# Patient Record
Sex: FEMALE | Race: WHITE | NOT HISPANIC OR LATINO | Employment: FULL TIME | URBAN - METROPOLITAN AREA
[De-identification: names, ages, dates, MRNs, and addresses within clinical notes are randomized per-mention and may not be internally consistent; named-entity substitution may affect disease eponyms.]

---

## 2017-01-09 ENCOUNTER — OFFICE VISIT (OUTPATIENT)
Dept: RHEUMATOLOGY | Facility: CLINIC | Age: 42
End: 2017-01-09
Payer: COMMERCIAL

## 2017-01-09 VITALS
WEIGHT: 174.31 LBS | HEIGHT: 66 IN | DIASTOLIC BLOOD PRESSURE: 93 MMHG | SYSTOLIC BLOOD PRESSURE: 145 MMHG | BODY MASS INDEX: 28.02 KG/M2 | HEART RATE: 84 BPM

## 2017-01-09 DIAGNOSIS — M32.12 SYSTEMIC LUPUS ERYTHEMATOSUS (SLE) WITH PERICARDITIS, UNSPECIFIED SLE TYPE: Primary | ICD-10-CM

## 2017-01-09 PROCEDURE — 1159F MED LIST DOCD IN RCRD: CPT | Mod: S$GLB,,, | Performed by: INTERNAL MEDICINE

## 2017-01-09 PROCEDURE — 99999 PR PBB SHADOW E&M-EST. PATIENT-LVL III: CPT | Mod: PBBFAC,,, | Performed by: INTERNAL MEDICINE

## 2017-01-09 PROCEDURE — 99214 OFFICE O/P EST MOD 30 MIN: CPT | Mod: S$GLB,,, | Performed by: INTERNAL MEDICINE

## 2017-01-09 ASSESSMENT — ROUTINE ASSESSMENT OF PATIENT INDEX DATA (RAPID3)
FATIGUE SCORE: 3
PATIENT GLOBAL ASSESSMENT SCORE: 3
PSYCHOLOGICAL DISTRESS SCORE: 2.2
MDHAQ FUNCTION SCORE: 0
TOTAL RAPID3 SCORE: 2
AM STIFFNESS SCORE: 0, NO
PAIN SCORE: 3

## 2017-01-11 NOTE — PROGRESS NOTES
History of present illness: 41-year-old female I saw one month ago.  She presented at that time with pleuropericarditis and arthritis.  She had a positive KRUNAL and anticardiolipin antibody.  I diagnosed her as having lupus.  She was on prednisone 20 mg daily.  I told her to decrease the prednisone to 10 mg and remain on that dosage until her next visit.  She ran out of the prednisone and did not get it refilled.  I also placed her on Plaquenil.  She has done fairly well since her last visit.  She has had some pain in the left scapula.  It is worse when she lays on her side or with walking.  She continues to have occasional pleuritic pain.  She has had no other areas of joint pain or swelling.  She has had no shortness of breath.  She has had no fevers.  She has developed some papules on her face.  She complains of dry eye and mouth.  She has had no abdominal pain.  She had a tubal ligation since her last visit.    Physical examination:  Skin: No rashes  ENT: Adequate tears and saliva  Chest: Clear to auscultation and percussion  Cardiac: No murmurs, gallops, rubs  Abdomen: No organomegaly or masses.  No tenderness to palpation  Musculoskeletal: Full range of motion of all joints.  No synovitis    Assessment: Active SLE    Plans: Continue Plaquenil as before.  Further laboratory studies are obtained.  She is to return to see me in 3 months.

## 2017-01-26 ENCOUNTER — TELEPHONE (OUTPATIENT)
Dept: RHEUMATOLOGY | Facility: CLINIC | Age: 42
End: 2017-01-26

## 2017-02-07 ENCOUNTER — PATIENT MESSAGE (OUTPATIENT)
Dept: RHEUMATOLOGY | Facility: CLINIC | Age: 42
End: 2017-02-07

## 2017-02-09 RX ORDER — PREDNISONE 5 MG/1
10 TABLET ORAL DAILY
Qty: 60 TABLET | Refills: 2 | Status: SHIPPED | OUTPATIENT
Start: 2017-02-09 | End: 2017-02-13 | Stop reason: SDUPTHER

## 2017-02-09 RX ORDER — INDOMETHACIN 25 MG/1
25 CAPSULE ORAL 3 TIMES DAILY
Qty: 90 CAPSULE | Refills: 3 | Status: SHIPPED | OUTPATIENT
Start: 2017-02-09 | End: 2017-02-13 | Stop reason: ALTCHOICE

## 2017-02-13 ENCOUNTER — OFFICE VISIT (OUTPATIENT)
Dept: RHEUMATOLOGY | Facility: CLINIC | Age: 42
End: 2017-02-13
Payer: COMMERCIAL

## 2017-02-13 DIAGNOSIS — M32.19 SYSTEMIC LUPUS ERYTHEMATOSUS WITH OTHER ORGAN INVOLVEMENT: ICD-10-CM

## 2017-02-13 DIAGNOSIS — M79.10 MYALGIA: Primary | ICD-10-CM

## 2017-02-13 PROBLEM — M32.9 SYSTEMIC LUPUS ERYTHEMATOSUS: Status: ACTIVE | Noted: 2017-02-13

## 2017-02-13 PROCEDURE — 99999 PR PBB SHADOW E&M-EST. PATIENT-LVL II: CPT | Mod: PBBFAC,,, | Performed by: INTERNAL MEDICINE

## 2017-02-13 PROCEDURE — 99214 OFFICE O/P EST MOD 30 MIN: CPT | Mod: S$GLB,,, | Performed by: INTERNAL MEDICINE

## 2017-02-13 RX ORDER — PREDNISONE 5 MG/1
10 TABLET ORAL DAILY
Qty: 60 TABLET | Refills: 2 | Status: SHIPPED | OUTPATIENT
Start: 2017-02-13 | End: 2017-03-15

## 2017-02-13 RX ORDER — IBUPROFEN 800 MG/1
800 TABLET ORAL 3 TIMES DAILY
Qty: 90 TABLET | Refills: 4 | Status: SHIPPED | OUTPATIENT
Start: 2017-02-13 | End: 2017-03-15

## 2017-02-13 NOTE — PROGRESS NOTES
History of present illness: 41-year-old female I saw initially in December.  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  She was not diagnosed as lupus at that time.  In November she had a flare of her arthritis and also developed pleuropericarditis, confirmed on CT scan.  She was placed on prednisone and indomethacin.  She had a history of photosensitive rash.  I diagnosed her as having SLE.  I placed her on a prednisone taper.  I also placed her on Plaquenil.  When she was seen in January she was doing quite well.  She had tapered herself off prednisone.  She had no sign of active disease.  I just continued her on Plaquenil.  Laboratory studies revealed increased inflammatory markers but I may no change in her treatment.    She called me 2 weeks later because of increased pain in the infrascapular region and some shortness of breath.  I placed her back on prednisone for 4 days.  This helped but her symptoms recurred on stopping the prednisone.  I placed her back on prednisone and she is now on 10 mg daily.  I also told her to resume indomethacin but her prescription had run out.  She is taking ibuprofen 800 mg twice daily with some relief.  She still has some pain in the left flank.  She has had some infrascapular pain.  She has had no fever, headache, rash.  The pain is occasionally pleuritic.  She has some shortness of breath with walking up stairs.  She has had no dysuria or hematuria.  She has had no abdominal pain.    Her pain is worse at the end of the day.  She denies any morning pain or morning stiffness.  She has had no muscle weakness.  The pain seems to start in her shoulder area and then radiate down her back.  She has had some pain in the neck.  She has had no problems in the arms or legs.    Physical examination:  Skin: She has malar erythema  Chest: Clear to auscultation and percussion  Cardiac: No murmurs, gallops, rubs  Abdomen: No organomegaly or masses.  No tenderness to  patient  Musculoskeletal: She has full range of motion of the cervical spine.  Shoulders have full range of motion without pain on range of motion.  She has no tender areas to palpation.  Elbows, wrists, small joints of the hand are unremarkable.  She has no tenderness along the thoracic or lumbar spine.  She does have pain in the left rib cage on lateral bending to the left.  She has no tender area to palpation.  She has no CVA tenderness.  Lumbar spine has no tenderness.  Hips, knees, ankles, small joints the feet are unremarkable.    Assessment: Clinically I cannot confirm a flare of her SLE.  I do not think she has had a recurrence of her pleurisy.  Her pain seems to be more muscular.  She did have the previous increase in inflammatory markers.    Plans:  1.  Laboratory studies are obtained  2.  Continue prednisone 10 mg daily for one more week, then decrease to 5 mg daily  3.  Continue ibuprofen up to 3 times daily  4.  Use heat to the area  5.  Continue Plaquenil as before  6.  Keep previous appointment

## 2017-04-07 RX ORDER — HYDROXYCHLOROQUINE SULFATE 200 MG/1
400 TABLET, FILM COATED ORAL DAILY
Qty: 60 TABLET | Refills: 4 | Status: SHIPPED | OUTPATIENT
Start: 2017-04-07 | End: 2017-05-07

## 2017-04-11 ENCOUNTER — OFFICE VISIT (OUTPATIENT)
Dept: RHEUMATOLOGY | Facility: CLINIC | Age: 42
End: 2017-04-11
Payer: COMMERCIAL

## 2017-04-11 VITALS
HEIGHT: 66 IN | HEART RATE: 80 BPM | SYSTOLIC BLOOD PRESSURE: 128 MMHG | DIASTOLIC BLOOD PRESSURE: 87 MMHG | WEIGHT: 171.31 LBS | BODY MASS INDEX: 27.53 KG/M2

## 2017-04-11 DIAGNOSIS — M32.19 SYSTEMIC LUPUS ERYTHEMATOSUS WITH OTHER ORGAN INVOLVEMENT: Primary | ICD-10-CM

## 2017-04-11 PROCEDURE — 1160F RVW MEDS BY RX/DR IN RCRD: CPT | Mod: S$GLB,,, | Performed by: INTERNAL MEDICINE

## 2017-04-11 PROCEDURE — 99999 PR PBB SHADOW E&M-EST. PATIENT-LVL III: CPT | Mod: PBBFAC,,, | Performed by: INTERNAL MEDICINE

## 2017-04-11 PROCEDURE — 99214 OFFICE O/P EST MOD 30 MIN: CPT | Mod: S$GLB,,, | Performed by: INTERNAL MEDICINE

## 2017-04-11 RX ORDER — IBUPROFEN 800 MG/1
800 TABLET ORAL 3 TIMES DAILY
Refills: 4 | COMMUNITY
Start: 2017-04-07 | End: 2017-10-16 | Stop reason: SDUPTHER

## 2017-04-11 RX ORDER — PREDNISONE 5 MG/1
TABLET ORAL
Refills: 2 | COMMUNITY
Start: 2017-04-07 | End: 2017-08-08 | Stop reason: SDUPTHER

## 2017-04-11 NOTE — PROGRESS NOTES
History of present illness: 41-year-old female I saw initially in December. 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 She was not diagnosed as lupus at that time. In November she had a flare of her arthritis and also developed pleuropericarditis, confirmed on CT scan. She was placed on prednisone and indomethacin. She had a history of photosensitive rash. I diagnosed her as having SLE. I placed her on a prednisone taper. I also placed her on Plaquenil. When she was seen in January she was doing quite well. She had tapered herself off prednisone. She had no sign of active disease. I just continued her on Plaquenil.  She then developed a flare of her pleuritic pain.  I increased her prednisone back to 10 mg daily.    Her pleuritic pain has improved.  There were several occasions where she took an extra prednisone but generally her pain is been under control.  She continues to take ibuprofen once or twice daily.  She remains on hydroxychloroquine and she is tolerating it.  She has had no fever or headache.  Her facial erythema persists.  She has had no conjunctivitis or oral ulcers.  She has occasional cough and shortness of breath.  She has had no further pleuritic pain.  She has had no abdominal pain.  She has had no pain or burning with urination.  She has had no joint swelling.  She has had no numbness or tingling.    Physical examination:  Skin: She has facial erythema  ENT: Adequate tears and saliva  Chest: Clear to auscultation and percussion  Cardiac: No murmurs, gallops, rubs  Abdomen: No organomegaly or masses.  No tenderness to palpation  Musculoskeletal: No tenderness to palpation.  No synovitis.    Assessment: Stable SLE    Plans:  1.  Laboratory studies are obtained  2.  Depending on the laboratory studies hopefully I can start tapering the prednisone  3.  Continue Plaquenil as before  4.  I have scheduled her for an echocardiogram  5.  I will see her in follow-up in 2 months.

## 2017-06-13 ENCOUNTER — OFFICE VISIT (OUTPATIENT)
Dept: RHEUMATOLOGY | Facility: CLINIC | Age: 42
End: 2017-06-13
Payer: COMMERCIAL

## 2017-06-13 ENCOUNTER — TELEPHONE (OUTPATIENT)
Dept: RHEUMATOLOGY | Facility: CLINIC | Age: 42
End: 2017-06-13

## 2017-06-13 ENCOUNTER — HOSPITAL ENCOUNTER (OUTPATIENT)
Dept: RADIOLOGY | Facility: HOSPITAL | Age: 42
Discharge: HOME | End: 2017-06-13
Attending: INTERNAL MEDICINE
Payer: COMMERCIAL

## 2017-06-13 VITALS
SYSTOLIC BLOOD PRESSURE: 115 MMHG | BODY MASS INDEX: 26.76 KG/M2 | HEART RATE: 86 BPM | WEIGHT: 166.5 LBS | HEIGHT: 66 IN | DIASTOLIC BLOOD PRESSURE: 76 MMHG

## 2017-06-13 DIAGNOSIS — M32.19 SYSTEMIC LUPUS ERYTHEMATOSUS WITH OTHER ORGAN INVOLVEMENT: ICD-10-CM

## 2017-06-13 DIAGNOSIS — Z79.52 LONG TERM CURRENT USE OF SYSTEMIC STEROIDS: ICD-10-CM

## 2017-06-13 DIAGNOSIS — M32.19 SYSTEMIC LUPUS ERYTHEMATOSUS WITH OTHER ORGAN INVOLVEMENT: Primary | ICD-10-CM

## 2017-06-13 DIAGNOSIS — J98.11 ATELECTASIS OF RIGHT LUNG: ICD-10-CM

## 2017-06-13 PROCEDURE — 99999 PR PBB SHADOW E&M-EST. PATIENT-LVL III: CPT | Mod: PBBFAC,,, | Performed by: INTERNAL MEDICINE

## 2017-06-13 PROCEDURE — 99214 OFFICE O/P EST MOD 30 MIN: CPT | Mod: S$GLB,,, | Performed by: INTERNAL MEDICINE

## 2017-06-13 PROCEDURE — 71020 XR CHEST PA AND LATERAL: CPT | Mod: 26,,, | Performed by: RADIOLOGY

## 2017-06-13 PROCEDURE — 71020 XR CHEST PA AND LATERAL: CPT | Mod: TC

## 2017-06-13 RX ORDER — HYDROXYCHLOROQUINE SULFATE 200 MG/1
400 TABLET, FILM COATED ORAL DAILY
Refills: 4 | COMMUNITY
Start: 2017-06-02 | End: 2017-09-08 | Stop reason: SDUPTHER

## 2017-06-13 NOTE — TELEPHONE ENCOUNTER
----- Message from Teri Sawyer MA sent at 6/13/2017 12:21 PM CDT -----  Contact: self@ home   Pt wishes to see a pulmonologist here, you can put in the referral!  ----- Message -----  From: Giana James  Sent: 6/13/2017  12:13 PM  To: Nora GALICIA Staff    Pt is calling to speak with Dr. [email or web address] about getting a referral.

## 2017-06-13 NOTE — PROGRESS NOTES
History of present illness: 41-year-old female I saw initially in December. 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 She was not diagnosed as lupus at that time. In November she had a flare of her arthritis and also developed pleuropericarditis, confirmed on CT scan. She was placed on prednisone and indomethacin. She had a history of photosensitive rash. I diagnosed her as having SLE. I placed her on a prednisone taper. I also placed her on Plaquenil.  When she was seen in April she was doing well.  I decreased her prednisone to 7.5 mg daily and eventually 5 mg daily.  She remains on Plaquenil.    On Friday she developed increased pain in the left shoulder.  Over the weekend is spread to the right shoulder and the back of the neck.  She also developed increased shortness of breath and fatigue.  This is similar to her previous flares.  She had been more active prior.  She had had no fever or rash.  She had some nasal congestion but no sore throat.  She has had some pleuritic pain.  She has had some shortness of breath.  She has had no joint swelling.  She increased her prednisone back to 7.5 mg daily and is feeling some better.  This is the first flare since her last visit.    Physical examination:  Skin: No rashes  ENT: No conjunctival injection or oral ulcers  Chest: Clear to auscultation and percussion.  No chest wall tenderness.  Cardiac: No murmurs, gallops, rubs  Abdomen: No organomegaly or masses.  No tenderness to palpation  Extremities: No pedal edema  Musculoskeletal: She has full range of motion of the shoulders without pain on range of motion.  She has no tender areas to palpation.  Elbows, wrists, small joints of the hands are unremarkable.  She has good range of motion of the cervical spine.  Lumbar spine is unremarkable.  Hips, knees, ankles, small joints of the feet are unremarkable.    Assessment: It is hard to tell at this time whether she had a lupus flare or the pain was  just muscular    Plans:  1.  Laboratory studies are obtained  2.  I have also ordered a chest x-ray  3.  She is to continue ibuprofen up to 3 times daily.  4.  Continue prednisone 7.5 mg daily  5.  Continue Plaquenil  6.  I discussed briefly other alternatives if her lupus continues to flare  7.  Return to see me in 2 months  Answers for HPI/ROS submitted by the patient on 6/11/2017   fever: No  eye redness: No  swollen glands: No  headaches: Yes  shortness of breath: Yes  chest pain: No  trouble swallowing: No  heartburn: No  diarrhea: No  constipation: No  unexpected weight change: No  genital sore: No  dysuria: No  During the last 3 days, have you had a skin rash?: No  tingling: No  Bruises or bleeds easily: No  cough: No

## 2017-06-20 DIAGNOSIS — R05.9 COUGH: Primary | ICD-10-CM

## 2017-06-22 ENCOUNTER — HOSPITAL ENCOUNTER (OUTPATIENT)
Dept: PULMONOLOGY | Facility: CLINIC | Age: 42
Discharge: HOME | End: 2017-06-22
Payer: COMMERCIAL

## 2017-06-22 ENCOUNTER — HOSPITAL ENCOUNTER (OUTPATIENT)
Dept: RADIOLOGY | Facility: HOSPITAL | Age: 42
Discharge: HOME | End: 2017-06-22
Attending: INTERNAL MEDICINE
Payer: COMMERCIAL

## 2017-06-22 ENCOUNTER — OFFICE VISIT (OUTPATIENT)
Dept: PULMONOLOGY | Facility: CLINIC | Age: 42
End: 2017-06-22
Payer: COMMERCIAL

## 2017-06-22 VITALS
BODY MASS INDEX: 26.53 KG/M2 | OXYGEN SATURATION: 98 % | SYSTOLIC BLOOD PRESSURE: 120 MMHG | HEIGHT: 67 IN | DIASTOLIC BLOOD PRESSURE: 80 MMHG | WEIGHT: 169 LBS | HEART RATE: 80 BPM

## 2017-06-22 DIAGNOSIS — R05.9 COUGH: ICD-10-CM

## 2017-06-22 DIAGNOSIS — M32.9 SYSTEMIC LUPUS ERYTHEMATOSUS, UNSPECIFIED SLE TYPE, UNSPECIFIED ORGAN INVOLVEMENT STATUS: Primary | ICD-10-CM

## 2017-06-22 DIAGNOSIS — M32.9 SYSTEMIC LUPUS ERYTHEMATOSUS, UNSPECIFIED SLE TYPE, UNSPECIFIED ORGAN INVOLVEMENT STATUS: ICD-10-CM

## 2017-06-22 LAB
PRE FEV1 FVC: 79
PRE FEV1: 2.71
PRE FVC: 3.42
PREDICTED FEV1 FVC: 82
PREDICTED FEV1: 3.03
PREDICTED FVC: 3.67

## 2017-06-22 PROCEDURE — 71250 CT THORAX DX C-: CPT | Mod: 26,,, | Performed by: RADIOLOGY

## 2017-06-22 PROCEDURE — 71250 CT THORAX DX C-: CPT | Mod: TC

## 2017-06-22 PROCEDURE — 94727 GAS DIL/WSHOT DETER LNG VOL: CPT | Mod: S$GLB,,, | Performed by: INTERNAL MEDICINE

## 2017-06-22 PROCEDURE — 94729 DIFFUSING CAPACITY: CPT | Mod: S$GLB,,, | Performed by: INTERNAL MEDICINE

## 2017-06-22 PROCEDURE — 94010 BREATHING CAPACITY TEST: CPT | Mod: 59,S$GLB,, | Performed by: INTERNAL MEDICINE

## 2017-06-22 PROCEDURE — 99999 PR PBB SHADOW E&M-EST. PATIENT-LVL III: CPT | Mod: PBBFAC,,, | Performed by: INTERNAL MEDICINE

## 2017-06-22 PROCEDURE — 99244 OFF/OP CNSLTJ NEW/EST MOD 40: CPT | Mod: 25,S$GLB,, | Performed by: INTERNAL MEDICINE

## 2017-06-22 NOTE — LETTER
June 24, 2017      Yossi Melendez MD  [address]  [place] [address]           [facility]  [address]  [place] [address]  Phone: [phone]          Patient: Rico Copeland   MR Number: [id number]   YOB: 1975   Date of Visit: 6/22/2017       Dear Dr. Yossi Melendez:    Thank you for referring Rico Copeland to me for evaluation. Attached you will find relevant portions of my assessment and plan of care.    If you have questions, please do not hesitate to call me. I look forward to following Rico Copeland along with you.    Sincerely,    Germaine Crandall MD    Enclosure  CC:  No Recipients    If you would like to receive this communication electronically, please contact [email or web address] or [phone] to request more information on [employer] access.    For providers and/or their staff who would like to refer a patient to [facility], please contact us through our one-stop-shop provider referral line, [facility], at [phone].    If you feel you have received this communication in error or would no longer like to receive these types of communications, please e-mail [email or web address]

## 2017-06-25 NOTE — PROGRESS NOTES
Subjective:       Patient ID: Rico Copeland is a 41 y.o. female.  Consult placed by: Dr. Melendez  Reason for consult: Lupus associated lung disease  Chief Complaint: No chief complaint on file.    41 year old female with PMHx of Lupus diagnosed in December who follows with Dr. Melendez. She is currently on Prednisone and Plaquenil. Patient recently had worsening of pain, SOB and fatigue. Patient states that she has some JONES. Denies fever, chills, nausea, vomiting or unintentional weight loss.   Denies sinus symptoms.   Patient has episode requiring hospitalization. At the time she had a pleural and pericardial effusion. Theses effusions were thought to be associated with Lupus.   Patient is an occasional smoker.        Review of Systems   Constitutional: Positive for fever and fatigue. Negative for weight loss and weight gain.   HENT: Negative for postnasal drip, rhinorrhea and congestion.    Respiratory: Negative for cough, sputum production, shortness of breath and dyspnea on extertion.    Cardiovascular: Negative for chest pain and leg swelling.   Musculoskeletal: Positive for arthralgias. Negative for myalgias.   Skin: Negative for rash.   Gastrointestinal: Negative for nausea and vomiting.   Neurological: Negative for syncope and weakness.   Hematological: Does not bruise/bleed easily and no excessive bruising.   Psychiatric/Behavioral: Negative for sleep disturbance. The patient is not nervous/anxious.      No past medical history on file.  Reviewed PMHx  Reviewed PShx  Social History     Social History    Marital status:      Spouse name: N/A    Number of children: N/A    Years of education: N/A     Occupational History    Not on file.     Social History Main Topics    Smoking status: Current Some Day Smoker     Packs/day: 0.10     Years: 25.00     Types: Cigarettes    Smokeless tobacco: Never Used    Alcohol use Not on file    Drug use: Unknown    Sexual activity: Not on file     Other Topics  "Concern    Not on file     Social History Narrative    No narrative on file     Review of patient's allergies indicates:  No Known Allergies    Objective:       Vitals:    06/22/17 1318   BP: 120/80   Pulse: 80   SpO2: 98%   Weight: 76.7 kg (169 lb)   Height: 5' 7" (1.702 m)   PainSc: 0-No pain       Physical Exam   Constitutional: She is oriented to person, place, and time. She appears well-developed and well-nourished. No distress.   HENT:   Head: Normocephalic.   Nose: Nose normal.   Neck: Normal range of motion. Neck supple.   Cardiovascular: Normal rate and regular rhythm.    Pulmonary/Chest: Normal expansion and effort normal.   Abdominal: Soft. Bowel sounds are normal. She exhibits no mass. There is no tenderness. There is no guarding.   Musculoskeletal: Normal range of motion. She exhibits no edema or deformity.   Neurological: She is alert and oriented to person, place, and time.   Skin: Skin is warm and dry. No rash noted. She is not diaphoretic. No erythema.   Psychiatric: She has a normal mood and affect. Her behavior is normal.   Nursing note and vitals reviewed.    Personal Diagnostic Review  Pulmonary function tests: PFTs are normal   No flowsheet data found.      Assessment:       1. Systemic lupus erythematosus, unspecified SLE type, unspecified organ involvement status        Outpatient Encounter Prescriptions as of 6/22/2017   Medication Sig Dispense Refill    buPROPion (WELLBUTRIN XL) 150 MG TB24 tablet TK 1 T PO D  5    hydroxychloroquine (PLAQUENIL) 200 mg tablet Take 400 mg by mouth once daily.  4    ibuprofen (ADVIL,MOTRIN) 800 MG tablet Take 800 mg by mouth 3 (three) times daily.  4    predniSONE (DELTASONE) 5 MG tablet Take 2 tablets (10 mg total) by mouth once daily.  2     No facility-administered encounter medications on file as of 6/22/2017.      Orders Placed This Encounter   Procedures    CT Chest Without Contrast     Standing Status:   Future     Number of Occurrences:   1     " Standing Expiration Date:   6/22/2018     Order Specific Question:   May the Radiologist modify the order per protocol to meet the clinical needs of the patient?     Answer:   Yes     Plan:       41 year old female with     Lupus- Patient had PFTs without suggestion of ILD. Symptoms of SOB seem to be improving. I ordered repeat CT chest. Patient has improved CT chest without signs of ILD. Recommend:   -Repeat PFTs in 1 year.   -Continue treatment for Lupus.    Thank you for allowing me to participate in Ms. Copeland's care.     Follow up in 1 year or sooner if symptoms arise.     Germaine Crandall MD

## 2017-08-08 RX ORDER — PREDNISONE 5 MG/1
10 TABLET ORAL DAILY
Qty: 60 TABLET | Refills: 2 | Status: SHIPPED | OUTPATIENT
Start: 2017-08-08 | End: 2017-11-22 | Stop reason: SDUPTHER

## 2017-08-14 ENCOUNTER — OFFICE VISIT (OUTPATIENT)
Dept: RHEUMATOLOGY | Facility: CLINIC | Age: 42
End: 2017-08-14
Payer: COMMERCIAL

## 2017-08-14 VITALS
RESPIRATION RATE: 18 BRPM | HEART RATE: 72 BPM | WEIGHT: 173.31 LBS | DIASTOLIC BLOOD PRESSURE: 85 MMHG | BODY MASS INDEX: 27.14 KG/M2 | SYSTOLIC BLOOD PRESSURE: 127 MMHG

## 2017-08-14 DIAGNOSIS — M32.19 SYSTEMIC LUPUS ERYTHEMATOSUS WITH OTHER ORGAN INVOLVEMENT: Primary | ICD-10-CM

## 2017-08-14 DIAGNOSIS — Z79.52 LONG TERM CURRENT USE OF SYSTEMIC STEROIDS: ICD-10-CM

## 2017-08-14 PROCEDURE — 3008F BODY MASS INDEX DOCD: CPT | Mod: S$GLB,,, | Performed by: INTERNAL MEDICINE

## 2017-08-14 PROCEDURE — 99999 PR PBB SHADOW E&M-EST. PATIENT-LVL III: CPT | Mod: PBBFAC,,, | Performed by: INTERNAL MEDICINE

## 2017-08-14 PROCEDURE — 99214 OFFICE O/P EST MOD 30 MIN: CPT | Mod: S$GLB,,, | Performed by: INTERNAL MEDICINE

## 2017-08-14 NOTE — PROGRESS NOTES
History of present illness: 41-year-old female was diagnosed as having SLE one year ago.  She presented at that time with arthritis and pleuropericarditis.  She had a history of photosensitive rash.  She was placed on prednisone and Plaquenil.  She was last seen in June.  She had had increased pain in the left shoulder, right shoulder, and then the neck.  She had shortness of breath and fatigue.  Chest x-ray showed evidence of atelectasis.  She had an increase in her inflammatory markers.  I increased her prednisone to 10 mg daily.  I referred her to pulmonary.  By the time she was seen her symptoms had improved.  Her pulmonary evaluation showed no evidence of interstitial lung disease.  Her CT scan showed clearing of the abnormalities on the chest x-ray.  She was feeling better and she decreased her prednisone back to 7.5 mg daily.  She has had some fatigue but is otherwise doing well.  She has had no fever, rash, conjunctivitis, oral ulcers, Raynaud's phenomena, pleurisy.  She has had no joint pain or swelling.  She has noted some hair loss but has no bald patches.    Physical examination:  Skin: No rashes  ENT: Adequate tears and saliva  Chest: Clear to auscultation and percussion  Cardiac: No murmurs, gallops, rubs  Abdomen: No organomegaly or masses.  No tenderness to palpation  Extremities: No pedal edema  Musculoskeletal: Full range of motion of all joints.  No synovitis.    Assessment: Stable SLE    Plans:  1.  Laboratory studies are obtained  2.  Assuming the laboratory studies are normal, she will decrease the prednisone to 5 mg daily.  As long as she is stable, she may decrease the prednisone by 2.5 mg monthly.  3.  Return to see me in 3 months

## 2017-08-21 ENCOUNTER — PATIENT MESSAGE (OUTPATIENT)
Dept: RHEUMATOLOGY | Facility: CLINIC | Age: 42
End: 2017-08-21

## 2017-08-21 NOTE — TELEPHONE ENCOUNTER
Patient sent message regarding pain with lowering prednisone from 7.5 mg to 5 mg .  Patient instructed to alternate 7.5 mg with 5 mg and follow with Dr. Melendez in a week.

## 2017-09-08 ENCOUNTER — PATIENT MESSAGE (OUTPATIENT)
Dept: RHEUMATOLOGY | Facility: CLINIC | Age: 42
End: 2017-09-08

## 2017-09-08 RX ORDER — PREDNISONE 1 MG/1
1 TABLET ORAL DAILY
Qty: 30 TABLET | Refills: 4 | Status: SHIPPED | OUTPATIENT
Start: 2017-09-08 | End: 2017-10-08

## 2017-09-08 RX ORDER — HYDROXYCHLOROQUINE SULFATE 200 MG/1
400 TABLET, FILM COATED ORAL DAILY
Qty: 60 TABLET | Refills: 6 | Status: SHIPPED | OUTPATIENT
Start: 2017-09-08 | End: 2017-10-08

## 2017-10-16 RX ORDER — IBUPROFEN 800 MG/1
800 TABLET ORAL 3 TIMES DAILY
Qty: 90 TABLET | Refills: 1 | Status: SHIPPED | OUTPATIENT
Start: 2017-10-16 | End: 2018-04-10 | Stop reason: SDUPTHER

## 2017-11-22 ENCOUNTER — LAB VISIT (OUTPATIENT)
Dept: LAB | Facility: HOSPITAL | Age: 42
End: 2017-11-22
Attending: INTERNAL MEDICINE
Payer: COMMERCIAL

## 2017-11-22 ENCOUNTER — OFFICE VISIT (OUTPATIENT)
Dept: RHEUMATOLOGY | Facility: CLINIC | Age: 42
End: 2017-11-22
Payer: COMMERCIAL

## 2017-11-22 VITALS
WEIGHT: 161 LBS | HEART RATE: 74 BPM | HEIGHT: 67 IN | SYSTOLIC BLOOD PRESSURE: 123 MMHG | DIASTOLIC BLOOD PRESSURE: 83 MMHG | BODY MASS INDEX: 25.27 KG/M2

## 2017-11-22 DIAGNOSIS — M32.19 OTHER SYSTEMIC LUPUS ERYTHEMATOSUS WITH OTHER ORGAN INVOLVEMENT: Primary | ICD-10-CM

## 2017-11-22 DIAGNOSIS — M32.19 OTHER SYSTEMIC LUPUS ERYTHEMATOSUS WITH OTHER ORGAN INVOLVEMENT: ICD-10-CM

## 2017-11-22 LAB
ALBUMIN SERPL BCP-MCNC: 3.7 G/DL
ALP SERPL-CCNC: 84 U/L
ALT SERPL W/O P-5'-P-CCNC: 27 U/L
ANION GAP SERPL CALC-SCNC: 8 MMOL/L
AST SERPL-CCNC: 21 U/L
BASOPHILS # BLD AUTO: 0.07 K/UL
BASOPHILS NFR BLD: 0.8 %
BILIRUB SERPL-MCNC: 0.4 MG/DL
BUN SERPL-MCNC: 10 MG/DL
C3 SERPL-MCNC: 129 MG/DL
C4 SERPL-MCNC: 22 MG/DL
CALCIUM SERPL-MCNC: 10 MG/DL
CHLORIDE SERPL-SCNC: 104 MMOL/L
CO2 SERPL-SCNC: 27 MMOL/L
CREAT SERPL-MCNC: 0.7 MG/DL
CRP SERPL-MCNC: 13 MG/L
DIFFERENTIAL METHOD: ABNORMAL
EOSINOPHIL # BLD AUTO: 0.1 K/UL
EOSINOPHIL NFR BLD: 1.2 %
ERYTHROCYTE [DISTWIDTH] IN BLOOD BY AUTOMATED COUNT: 12.2 %
ERYTHROCYTE [SEDIMENTATION RATE] IN BLOOD BY WESTERGREN METHOD: 31 MM/HR
EST. GFR  (AFRICAN AMERICAN): >60 ML/MIN/1.73 M^2
EST. GFR  (NON AFRICAN AMERICAN): >60 ML/MIN/1.73 M^2
GLUCOSE SERPL-MCNC: 80 MG/DL
HCT VFR BLD AUTO: 40.6 %
HGB BLD-MCNC: 13.9 G/DL
IMM GRANULOCYTES # BLD AUTO: 0.03 K/UL
IMM GRANULOCYTES NFR BLD AUTO: 0.3 %
LYMPHOCYTES # BLD AUTO: 2 K/UL
LYMPHOCYTES NFR BLD: 21 %
MCH RBC QN AUTO: 32.5 PG
MCHC RBC AUTO-ENTMCNC: 34.2 G/DL
MCV RBC AUTO: 95 FL
MONOCYTES # BLD AUTO: 1 K/UL
MONOCYTES NFR BLD: 11.2 %
NEUTROPHILS # BLD AUTO: 6.1 K/UL
NEUTROPHILS NFR BLD: 65.5 %
NRBC BLD-RTO: 0 /100 WBC
PLATELET # BLD AUTO: 306 K/UL
PMV BLD AUTO: 10.9 FL
POTASSIUM SERPL-SCNC: 3.9 MMOL/L
PROT SERPL-MCNC: 7.5 G/DL
RBC # BLD AUTO: 4.28 M/UL
SODIUM SERPL-SCNC: 139 MMOL/L
WBC # BLD AUTO: 9.32 K/UL

## 2017-11-22 PROCEDURE — 99214 OFFICE O/P EST MOD 30 MIN: CPT | Mod: S$GLB,,, | Performed by: INTERNAL MEDICINE

## 2017-11-22 PROCEDURE — 99999 PR PBB SHADOW E&M-EST. PATIENT-LVL III: CPT | Mod: PBBFAC,,, | Performed by: INTERNAL MEDICINE

## 2017-11-22 PROCEDURE — 86140 C-REACTIVE PROTEIN: CPT

## 2017-11-22 PROCEDURE — 85651 RBC SED RATE NONAUTOMATED: CPT

## 2017-11-22 PROCEDURE — 36415 COLL VENOUS BLD VENIPUNCTURE: CPT

## 2017-11-22 PROCEDURE — 86160 COMPLEMENT ANTIGEN: CPT | Mod: 59

## 2017-11-22 PROCEDURE — 80053 COMPREHEN METABOLIC PANEL: CPT

## 2017-11-22 PROCEDURE — 86160 COMPLEMENT ANTIGEN: CPT

## 2017-11-22 PROCEDURE — 85025 COMPLETE CBC W/AUTO DIFF WBC: CPT

## 2017-11-22 RX ORDER — PREDNISONE 1 MG/1
2 TABLET ORAL DAILY
Qty: 180 TABLET | Refills: 1 | Status: SHIPPED | OUTPATIENT
Start: 2017-11-22 | End: 2018-04-27 | Stop reason: ALTCHOICE

## 2017-11-22 NOTE — PROGRESS NOTES
History of present illness: 42-year-old female I saw initially one year ago.  She presented at that time with a two-year history of migratory arthritis, pleuropericarditis, and photosensitive skin rash.  She was diagnosed as having SLE.  She had no other systemic involvement.  She did have an abnormal chest x-ray but CT scan showed clearing of the abnormality.  She had normal pulmonary function tests.  She has been on Plaquenil and prednisone.  I have been gradually decreasing her prednisone.  She is now on 2 mg daily.  She is tolerating this dose of prednisone.  She still has occasional aching.  She takes ibuprofen frequently.    She has had no unexplained fevers.  She had some headache last week but this is unusual.  She developed a rash at the corner of her jaw.  She was evaluated by dermatology.  Culture was negative.  No definitive diagnosis was made but she was told it is not due to her lupus.  She has had no further chest pain or shortness of breath.  She has had no abdominal pain.  She has had no joint swelling but has occasional swelling in her feet.  Her main problem appears to be fatigue.    Physical examination:  Skin: She has several papules at the right jaw walking  ENT: Adequate tears and saliva  Chest: Clear to auscultation and percussion  Cardiac: No murmurs, gallops, rubs  Abdomen: No organomegaly or masses.  No tenderness to palpation  Extremities: No pedal edema  Musculoskeletal: Full range of motion of all joints.  No synovitis.    Assessment: No evidence of active SLE    Plans:  1.  Laboratory studies are obtained  2.  Continue prednisone taper  3.  Continue Plaquenil as before  4.  Return to see me in 4 months

## 2017-12-08 ENCOUNTER — PATIENT MESSAGE (OUTPATIENT)
Dept: RHEUMATOLOGY | Facility: CLINIC | Age: 42
End: 2017-12-08

## 2017-12-08 RX ORDER — NYSTATIN 100000 [USP'U]/ML
4 SUSPENSION ORAL 4 TIMES DAILY
Qty: 160 ML | Refills: 0 | Status: SHIPPED | OUTPATIENT
Start: 2017-12-08 | End: 2017-12-18

## 2018-04-10 RX ORDER — HYDROXYCHLOROQUINE SULFATE 200 MG/1
TABLET, FILM COATED ORAL
Refills: 6 | COMMUNITY
Start: 2018-03-07 | End: 2018-04-13 | Stop reason: SDUPTHER

## 2018-04-10 RX ORDER — HYDROXYCHLOROQUINE SULFATE 200 MG/1
TABLET, FILM COATED ORAL
Refills: 6 | OUTPATIENT
Start: 2018-04-10

## 2018-04-10 RX ORDER — IBUPROFEN 800 MG/1
800 TABLET ORAL 3 TIMES DAILY
Qty: 90 TABLET | Refills: 0 | Status: SHIPPED | OUTPATIENT
Start: 2018-04-10 | End: 2019-01-08 | Stop reason: SDUPTHER

## 2018-04-13 ENCOUNTER — PATIENT MESSAGE (OUTPATIENT)
Dept: RHEUMATOLOGY | Facility: CLINIC | Age: 43
End: 2018-04-13

## 2018-04-13 RX ORDER — HYDROXYCHLOROQUINE SULFATE 200 MG/1
TABLET, FILM COATED ORAL
Qty: 60 TABLET | Refills: 6 | Status: SHIPPED | OUTPATIENT
Start: 2018-04-13 | End: 2018-12-07 | Stop reason: SDUPTHER

## 2018-04-27 ENCOUNTER — OFFICE VISIT (OUTPATIENT)
Dept: RHEUMATOLOGY | Facility: CLINIC | Age: 43
End: 2018-04-27
Payer: COMMERCIAL

## 2018-04-27 VITALS
SYSTOLIC BLOOD PRESSURE: 121 MMHG | BODY MASS INDEX: 26.78 KG/M2 | DIASTOLIC BLOOD PRESSURE: 82 MMHG | HEART RATE: 70 BPM | WEIGHT: 171 LBS

## 2018-04-27 DIAGNOSIS — M32.19 OTHER SYSTEMIC LUPUS ERYTHEMATOSUS WITH OTHER ORGAN INVOLVEMENT: Primary | ICD-10-CM

## 2018-04-27 PROBLEM — Z79.52 LONG TERM CURRENT USE OF SYSTEMIC STEROIDS: Status: RESOLVED | Noted: 2017-06-13 | Resolved: 2018-04-27

## 2018-04-27 PROCEDURE — 99999 PR PBB SHADOW E&M-EST. PATIENT-LVL III: CPT | Mod: PBBFAC,,, | Performed by: INTERNAL MEDICINE

## 2018-04-27 PROCEDURE — 99213 OFFICE O/P EST LOW 20 MIN: CPT | Mod: S$GLB,,, | Performed by: INTERNAL MEDICINE

## 2018-04-27 NOTE — MEDICAL/APP STUDENT
Subjective:       Patient ID: Rico Copeland is a 42 y.o. female.    Chief Complaint: Disease Management    Ms. Copeland is a 43 yo F with SLE that presents for 4-month follow-up.    She was last seen in 11/2017. She has tapered off her prednisone. Since tapering off, she has had 2 minor flares. Both occurred in 12/2017 and lasted a few days. She took prednisone 5mg during these flares, which provided significant relief. Since then, she has not had any flares and been off the prednisone. She has had occasional minor shoulder aches that are relieved with ibuprofen 800mg. She is currently on plaquenil 400mg. She states that her last eye exam has been within the last year and that her ophthalmologist usually informs her when she is due.      Review of Systems   Constitutional: Negative for chills and fever.   HENT: Negative for mouth sores and trouble swallowing.         No dry mouth   Eyes: Negative for redness.        No dry eyes   Respiratory: Positive for shortness of breath (occassional, associated with exertion).         No pleurisy   Cardiovascular: Negative for chest pain and leg swelling.   Gastrointestinal: Negative for blood in stool, constipation, nausea and vomiting.   Genitourinary: Negative for difficulty urinating, genital sores and vaginal discharge.   Musculoskeletal: Negative for arthralgias, back pain and joint swelling.   Skin: Negative for color change and rash.   Neurological: Positive for headaches (frontal, more frequently in last 2 weeks, associated with allergies). Negative for light-headedness.       Objective:      Physical Exam   Constitutional: She appears well-developed and well-nourished.   HENT:   Nose: Nose normal.   Mouth/Throat: Oropharynx is clear and moist. No oropharyngeal exudate.   Eyes: Conjunctivae are normal.   Neck: Normal range of motion.   Musculoskeletal: Normal range of motion.   No abnormalities in the hands, wrists, elbows, shoulders, neck, back, knees, ankles, or feet.        Assessment/Plan:       Systemic lupus erythematosus with other organ involvement  Asymptomatic, however she had elevated inflammatory markers last visit.  Will continue plaquenil 400mg.  Instructed patient to call office if symptoms get worse or she has another flare.  -     Sedimentation rate, manual; Future  -     C-reactive protein; Future    Follow-up in 6 months

## 2018-04-30 NOTE — PROGRESS NOTES
History of present illness: 42-year-old female I saw initially one year ago.  She presented at that time with a two-year history of migratory arthritis, pleuropericarditis, and photosensitive skin rash.  She was diagnosed as having SLE.  She had no other systemic involvement.  She did have an abnormal chest x-ray but CT scan showed clearing of the abnormality.  She had normal pulmonary function tests.  She remains on Plaquenil.  She has tapered off prednisone.  She took it on 2 occasions for joint aching but this resolved in 48 hours.  She has had no joint swelling.  She has had no fever, rash, conjunctivitis, oral ulcers, dry eye or mouth, Raynaud's phenomena, pleurisy, vaginal discharge or ulcers.  She has had no other recent medical problems.    Physical examination:  Musculoskeletal: Full range of motion of all joints.  No synovitis.  No tender areas to palpation.    Assessment: No evidence of active SLE    Plans:  1.  Laboratory studies are obtained  2.  Continue Plaquenil as before  3.  Return to see me in 6 months

## 2018-12-07 ENCOUNTER — TELEPHONE (OUTPATIENT)
Dept: RHEUMATOLOGY | Facility: CLINIC | Age: 43
End: 2018-12-07

## 2018-12-07 DIAGNOSIS — M32.19 OTHER SYSTEMIC LUPUS ERYTHEMATOSUS WITH OTHER ORGAN INVOLVEMENT: Primary | ICD-10-CM

## 2018-12-07 RX ORDER — HYDROXYCHLOROQUINE SULFATE 200 MG/1
TABLET, FILM COATED ORAL
Qty: 60 TABLET | Refills: 2 | Status: SHIPPED | OUTPATIENT
Start: 2018-12-07 | End: 2019-02-08

## 2018-12-07 NOTE — TELEPHONE ENCOUNTER
----- Message from Anita Garcia MA sent at 12/7/2018 11:15 AM CST -----  Contact: Self  im going to schedule her with someone. But could you answer the other question about the plaquenil and I will let her know? Thank you  ----- Message -----  From: Ella Thorne  Sent: 12/6/2018  11:05 AM  To: Nora GALICIA Staff    Pt called requesting a return call back regarding an appt and needing to know if she needs to see a new doctor before receiving her PLAQUENIL medication. Pt stated that it's been 6 months [facility] she last saw her doctor. Pt could be reached at [phone]

## 2018-12-07 NOTE — TELEPHONE ENCOUNTER
Not a problem; tell her that I will refill the plaquenil so she can continue it until she has a f/u visit  WD

## 2019-01-08 RX ORDER — IBUPROFEN 800 MG/1
800 TABLET ORAL 3 TIMES DAILY
Qty: 90 TABLET | Refills: 0 | Status: SHIPPED | OUTPATIENT
Start: 2019-01-08 | End: 2019-02-08

## 2019-01-09 ENCOUNTER — PATIENT MESSAGE (OUTPATIENT)
Dept: RHEUMATOLOGY | Facility: CLINIC | Age: 44
End: 2019-01-09

## 2019-01-17 ENCOUNTER — TELEPHONE (OUTPATIENT)
Dept: RHEUMATOLOGY | Facility: CLINIC | Age: 44
End: 2019-01-17

## 2019-01-21 ENCOUNTER — PATIENT MESSAGE (OUTPATIENT)
Dept: RHEUMATOLOGY | Facility: CLINIC | Age: 44
End: 2019-01-21

## 2019-02-07 ENCOUNTER — TELEPHONE (OUTPATIENT)
Dept: RHEUMATOLOGY | Facility: CLINIC | Age: 44
End: 2019-02-07

## 2019-02-07 ENCOUNTER — PATIENT MESSAGE (OUTPATIENT)
Dept: RHEUMATOLOGY | Facility: CLINIC | Age: 44
End: 2019-02-07

## 2019-02-07 DIAGNOSIS — M32.9 SYSTEMIC LUPUS ERYTHEMATOSUS, UNSPECIFIED SLE TYPE, UNSPECIFIED ORGAN INVOLVEMENT STATUS: Primary | ICD-10-CM

## 2019-02-08 ENCOUNTER — OFFICE VISIT (OUTPATIENT)
Dept: RHEUMATOLOGY | Facility: CLINIC | Age: 44
End: 2019-02-08
Payer: COMMERCIAL

## 2019-02-08 ENCOUNTER — LAB VISIT (OUTPATIENT)
Dept: LAB | Facility: HOSPITAL | Age: 44
End: 2019-02-08
Attending: INTERNAL MEDICINE
Payer: COMMERCIAL

## 2019-02-08 VITALS
WEIGHT: 171.75 LBS | DIASTOLIC BLOOD PRESSURE: 81 MMHG | HEIGHT: 66 IN | SYSTOLIC BLOOD PRESSURE: 138 MMHG | HEART RATE: 72 BPM | BODY MASS INDEX: 27.6 KG/M2

## 2019-02-08 DIAGNOSIS — M32.19 OTHER SYSTEMIC LUPUS ERYTHEMATOSUS WITH OTHER ORGAN INVOLVEMENT: ICD-10-CM

## 2019-02-08 DIAGNOSIS — M32.9 SYSTEMIC LUPUS ERYTHEMATOSUS, UNSPECIFIED SLE TYPE, UNSPECIFIED ORGAN INVOLVEMENT STATUS: ICD-10-CM

## 2019-02-08 DIAGNOSIS — M32.9 SYSTEMIC LUPUS ERYTHEMATOSUS, UNSPECIFIED SLE TYPE, UNSPECIFIED ORGAN INVOLVEMENT STATUS: Primary | ICD-10-CM

## 2019-02-08 LAB
ALBUMIN SERPL BCP-MCNC: 4.5 G/DL
ALP SERPL-CCNC: 88 U/L
ALT SERPL W/O P-5'-P-CCNC: 25 U/L
ANION GAP SERPL CALC-SCNC: 9 MMOL/L
AST SERPL-CCNC: 22 U/L
BASOPHILS # BLD AUTO: 0.07 K/UL
BASOPHILS NFR BLD: 0.9 %
BILIRUB SERPL-MCNC: 0.3 MG/DL
BUN SERPL-MCNC: 7 MG/DL
C3 SERPL-MCNC: 121 MG/DL
C4 SERPL-MCNC: 21 MG/DL
CALCIUM SERPL-MCNC: 10.3 MG/DL
CHLORIDE SERPL-SCNC: 103 MMOL/L
CO2 SERPL-SCNC: 27 MMOL/L
CREAT SERPL-MCNC: 0.8 MG/DL
CRP SERPL-MCNC: 5.4 MG/L
DIFFERENTIAL METHOD: ABNORMAL
EOSINOPHIL # BLD AUTO: 0.1 K/UL
EOSINOPHIL NFR BLD: 0.9 %
ERYTHROCYTE [DISTWIDTH] IN BLOOD BY AUTOMATED COUNT: 12 %
ERYTHROCYTE [SEDIMENTATION RATE] IN BLOOD BY WESTERGREN METHOD: 4 MM/HR
EST. GFR  (AFRICAN AMERICAN): >60 ML/MIN/1.73 M^2
EST. GFR  (NON AFRICAN AMERICAN): >60 ML/MIN/1.73 M^2
GLUCOSE SERPL-MCNC: 125 MG/DL
HCT VFR BLD AUTO: 44.4 %
HGB BLD-MCNC: 14.5 G/DL
IMM GRANULOCYTES # BLD AUTO: 0.02 K/UL
IMM GRANULOCYTES NFR BLD AUTO: 0.3 %
LYMPHOCYTES # BLD AUTO: 2.5 K/UL
LYMPHOCYTES NFR BLD: 32.3 %
MCH RBC QN AUTO: 32.1 PG
MCHC RBC AUTO-ENTMCNC: 32.7 G/DL
MCV RBC AUTO: 98 FL
MONOCYTES # BLD AUTO: 0.8 K/UL
MONOCYTES NFR BLD: 10.2 %
NEUTROPHILS # BLD AUTO: 4.4 K/UL
NEUTROPHILS NFR BLD: 55.4 %
NRBC BLD-RTO: 0 /100 WBC
PLATELET # BLD AUTO: 276 K/UL
PMV BLD AUTO: 11.1 FL
POTASSIUM SERPL-SCNC: 3.4 MMOL/L
PROT SERPL-MCNC: 8.1 G/DL
RBC # BLD AUTO: 4.52 M/UL
SODIUM SERPL-SCNC: 139 MMOL/L
WBC # BLD AUTO: 7.86 K/UL

## 2019-02-08 PROCEDURE — 80053 COMPREHEN METABOLIC PANEL: CPT

## 2019-02-08 PROCEDURE — 86225 DNA ANTIBODY NATIVE: CPT

## 2019-02-08 PROCEDURE — 99214 PR OFFICE/OUTPT VISIT, EST, LEVL IV, 30-39 MIN: ICD-10-PCS | Mod: S$GLB,,, | Performed by: INTERNAL MEDICINE

## 2019-02-08 PROCEDURE — 85025 COMPLETE CBC W/AUTO DIFF WBC: CPT

## 2019-02-08 PROCEDURE — 85652 RBC SED RATE AUTOMATED: CPT

## 2019-02-08 PROCEDURE — 86160 COMPLEMENT ANTIGEN: CPT

## 2019-02-08 PROCEDURE — 99999 PR PBB SHADOW E&M-EST. PATIENT-LVL III: ICD-10-PCS | Mod: PBBFAC,,, | Performed by: INTERNAL MEDICINE

## 2019-02-08 PROCEDURE — 36415 COLL VENOUS BLD VENIPUNCTURE: CPT

## 2019-02-08 PROCEDURE — 86160 COMPLEMENT ANTIGEN: CPT | Mod: 59

## 2019-02-08 PROCEDURE — 99999 PR PBB SHADOW E&M-EST. PATIENT-LVL III: CPT | Mod: PBBFAC,,, | Performed by: INTERNAL MEDICINE

## 2019-02-08 PROCEDURE — 86140 C-REACTIVE PROTEIN: CPT

## 2019-02-08 PROCEDURE — 99214 OFFICE O/P EST MOD 30 MIN: CPT | Mod: S$GLB,,, | Performed by: INTERNAL MEDICINE

## 2019-02-08 RX ORDER — HYDROXYCHLOROQUINE SULFATE 200 MG/1
TABLET, FILM COATED ORAL
Qty: 60 TABLET | Refills: 5 | Status: SHIPPED | OUTPATIENT
Start: 2019-02-08 | End: 2019-10-22 | Stop reason: SDUPTHER

## 2019-02-08 RX ORDER — IBUPROFEN 800 MG/1
800 TABLET ORAL EVERY 6 HOURS PRN
Qty: 90 TABLET | Refills: 0 | Status: SHIPPED | OUTPATIENT
Start: 2019-02-08 | End: 2019-03-10

## 2019-02-08 ASSESSMENT — SYSTEMIC LUPUS ERYTHEMATOSUS DISEASE ACTIVITY INDEX (SLEDAI): TOTAL_SCORE: 0

## 2019-02-08 NOTE — LETTER
February 8, 2019      Yossi Melendez MD  [address]  [place] [address]           [facility] - Rheumatology  [address]  [place] [address]  Phone: [phone]  Fax: [phone]          Patient: Rico Copeland   MR Number: [id number]   YOB: 1975   Date of Visit: 2/8/2019       Dear Dr. Yossi Melendez:    Thank you for referring Rico Copeland to me for evaluation. Attached you will find relevant portions of my assessment and plan of care.    If you have questions, please do not hesitate to call me. I look forward to following Rico Copeland along with you.    Sincerely,    Semaj Nagy MD    Enclosure  CC:  No Recipients    If you would like to receive this communication electronically, please contact [email or web address] or [phone] to request more information on [employer] access.    For providers and/or their staff who would like to refer a patient to [facility], please contact us through our one-stop-shop provider referral line, [facility], at [phone].    If you feel you have received this communication in error or would no longer like to receive these types of communications, please e-mail [email or web address]

## 2019-02-08 NOTE — PROGRESS NOTES
Chief Complaint   Patient presents with    Follow-up    Lupus       Patient with systemic lupus for a follow up  A patient of     History of presenting illness    43 year old female   She has had systemic lupus for 3 years  Joints + photosensitive skin rash + pleuropericarditis    She has been on plaquenil 200 mg bid    She has been symptom free for some time now    Labs today  nml CBC,ESR  Most of them are pending    Past history : none    Family history : maternal cousin has lupus    Social history : current smoker      Review of Systems   Constitutional: Negative for activity change, appetite change, chills, diaphoresis, fatigue, fever and unexpected weight change.   HENT: Negative for congestion, dental problem, drooling, ear discharge, ear pain, facial swelling, hearing loss, mouth sores, nosebleeds, postnasal drip, rhinorrhea, sinus pressure, sinus pain, sneezing, sore throat, tinnitus, trouble swallowing and voice change.    Eyes: Negative for photophobia, pain, discharge, redness, itching and visual disturbance.   Respiratory: Negative for apnea, cough, choking, chest tightness, shortness of breath, wheezing and stridor.    Cardiovascular: Negative for chest pain, palpitations and leg swelling.   Gastrointestinal: Negative for abdominal distention, abdominal pain, anal bleeding, blood in stool, constipation, diarrhea, nausea, rectal pain and vomiting.   Endocrine: Negative for cold intolerance, heat intolerance, polydipsia, polyphagia and polyuria.   Genitourinary: Negative for decreased urine volume, difficulty urinating, dysuria, enuresis, flank pain, frequency, genital sores, hematuria and urgency.   Musculoskeletal: Negative for arthralgias, back pain, gait problem, joint swelling, myalgias, neck pain and neck stiffness.   Skin: Negative for color change, pallor, rash and wound.   Allergic/Immunologic: Negative for environmental allergies, food allergies and immunocompromised state.    Neurological: Negative for dizziness, tremors, seizures, syncope, facial asymmetry, speech difficulty, weakness, light-headedness, numbness and headaches.   Hematological: Negative for adenopathy. Does not bruise/bleed easily.   Psychiatric/Behavioral: Negative for agitation, behavioral problems, confusion, decreased concentration, dysphoric mood, hallucinations, self-injury, sleep disturbance and suicidal ideas. The patient is not nervous/anxious and is not hyperactive.      Physical Exam     SUTTON-28 tender joint count: 0  SUTTON-28 swollen joint count: 0    Physical Exam   Constitutional: She is oriented to person, place, and time and well-developed, well-nourished, and in no distress. No distress.   HENT:   Head: Normocephalic.   Mouth/Throat: Oropharynx is clear and moist.   Eyes: Conjunctivae are normal. Pupils are equal, round, and reactive to light. Right eye exhibits no discharge. Left eye exhibits no discharge. No scleral icterus.   Neck: Normal range of motion. No thyromegaly present.   Cardiovascular: Normal rate, regular rhythm, normal heart sounds and intact distal pulses.    Pulmonary/Chest: Effort normal and breath sounds normal. No stridor.   Abdominal: Soft. Bowel sounds are normal.   Lymphadenopathy:     She has no cervical adenopathy.   Neurological: She is alert and oriented to person, place, and time.   Skin: Skin is warm. No rash noted. She is not diaphoretic.     Psychiatric: Affect and judgment normal.   Musculoskeletal: Normal range of motion.           Assessment     1. Systemic lupus erythematosus, unspecified SLE type, unspecified organ involvement status    2. Other systemic lupus erythematosus with other organ involvement        43 year old female   She has had systemic lupus for 3 years  Joints + photosensitive skin rash + pleuropericarditis    She has been on plaquenil 200 mg bid    She has been symptom free for some time now    Labs today pending    F/u problem    Plan    Continue  current meds    2 things emphasised    Quit smoking    Plaquenil eye exam    UTD with flu vaccine    Rico was seen today for follow-up and lupus.    Diagnoses and all orders for this visit:    Systemic lupus erythematosus, unspecified SLE type, unspecified organ involvement status  -     Protein / creatinine ratio, urine; Future  -     Urinalysis; Future  -     DXA Bone Density Spine And Hip; Future  -     CBC auto differential; Future  -     Comprehensive metabolic panel; Future  -     Sedimentation rate; Future  -     C-reactive protein; Future  -     C3 complement; Future  -     C4 complement; Future  -     Protein / creatinine ratio, urine; Future  -     Urinalysis; Future  -     Anti-DNA antibody, double-stranded; Future    Other systemic lupus erythematosus with other organ involvement  -     hydroxychloroquine (PLAQUENIL) 200 mg tablet; Take 2 tablets (400 mg total) by mouth once daily.    Other orders  -     ibuprofen (ADVIL,MOTRIN) 800 MG tablet; Take 1 tablet (800 mg total) by mouth every 6 (six) hours as needed for Pain.          Labs and rtc in 6 months  Discussed about doing pneumonia vaccine   dexa ordered

## 2019-02-11 LAB — DSDNA AB SER-ACNC: NORMAL [IU]/ML

## 2019-03-29 ENCOUNTER — PATIENT MESSAGE (OUTPATIENT)
Dept: RHEUMATOLOGY | Facility: CLINIC | Age: 44
End: 2019-03-29

## 2019-05-31 ENCOUNTER — PATIENT MESSAGE (OUTPATIENT)
Dept: RHEUMATOLOGY | Facility: CLINIC | Age: 44
End: 2019-05-31

## 2019-10-22 DIAGNOSIS — M32.19 OTHER SYSTEMIC LUPUS ERYTHEMATOSUS WITH OTHER ORGAN INVOLVEMENT: ICD-10-CM

## 2019-10-22 RX ORDER — HYDROXYCHLOROQUINE SULFATE 200 MG/1
TABLET, FILM COATED ORAL
Qty: 60 TABLET | Refills: 5 | Status: SHIPPED | OUTPATIENT
Start: 2019-10-22 | End: 2019-11-14

## 2019-11-04 ENCOUNTER — PATIENT MESSAGE (OUTPATIENT)
Dept: RHEUMATOLOGY | Facility: CLINIC | Age: 44
End: 2019-11-04

## 2019-11-07 ENCOUNTER — PATIENT MESSAGE (OUTPATIENT)
Dept: RHEUMATOLOGY | Facility: CLINIC | Age: 44
End: 2019-11-07

## 2019-11-07 ENCOUNTER — HOSPITAL ENCOUNTER (OUTPATIENT)
Dept: RADIOLOGY | Facility: HOSPITAL | Age: 44
Discharge: HOME | End: 2019-11-07
Attending: INTERNAL MEDICINE
Payer: COMMERCIAL

## 2019-11-07 DIAGNOSIS — M32.9 SYSTEMIC LUPUS ERYTHEMATOSUS, UNSPECIFIED SLE TYPE, UNSPECIFIED ORGAN INVOLVEMENT STATUS: ICD-10-CM

## 2019-11-07 PROCEDURE — 77080 DXA BONE DENSITY AXIAL: CPT | Mod: 26,,, | Performed by: RADIOLOGY

## 2019-11-07 PROCEDURE — 77080 DXA BONE DENSITY AXIAL: CPT | Mod: TC

## 2019-11-07 PROCEDURE — 77080 DEXA BONE DENSITY SPINE HIP: ICD-10-PCS | Mod: 26,,, | Performed by: RADIOLOGY

## 2019-11-11 RX ORDER — BUPROPION HYDROCHLORIDE 150 MG/1
150 TABLET ORAL DAILY
Qty: 30 TABLET | Refills: 4 | Status: SHIPPED | OUTPATIENT
Start: 2019-11-11 | End: 2019-12-11

## 2019-11-14 ENCOUNTER — OFFICE VISIT (OUTPATIENT)
Dept: RHEUMATOLOGY | Facility: CLINIC | Age: 44
End: 2019-11-14
Payer: COMMERCIAL

## 2019-11-14 ENCOUNTER — PATIENT MESSAGE (OUTPATIENT)
Dept: RHEUMATOLOGY | Facility: CLINIC | Age: 44
End: 2019-11-14

## 2019-11-14 VITALS — DIASTOLIC BLOOD PRESSURE: 79 MMHG | SYSTOLIC BLOOD PRESSURE: 132 MMHG | HEART RATE: 56 BPM

## 2019-11-14 DIAGNOSIS — M32.19 OTHER SYSTEMIC LUPUS ERYTHEMATOSUS WITH OTHER ORGAN INVOLVEMENT: ICD-10-CM

## 2019-11-14 PROCEDURE — 99999 PR PBB SHADOW E&M-EST. PATIENT-LVL II: CPT | Mod: PBBFAC,,, | Performed by: INTERNAL MEDICINE

## 2019-11-14 PROCEDURE — 99214 OFFICE O/P EST MOD 30 MIN: CPT | Mod: S$GLB,,, | Performed by: INTERNAL MEDICINE

## 2019-11-14 PROCEDURE — 99999 PR PBB SHADOW E&M-EST. PATIENT-LVL II: ICD-10-PCS | Mod: PBBFAC,,, | Performed by: INTERNAL MEDICINE

## 2019-11-14 PROCEDURE — 99214 PR OFFICE/OUTPT VISIT, EST, LEVL IV, 30-39 MIN: ICD-10-PCS | Mod: S$GLB,,, | Performed by: INTERNAL MEDICINE

## 2019-11-14 RX ORDER — HYDROXYCHLOROQUINE SULFATE 200 MG/1
TABLET, FILM COATED ORAL
Qty: 60 TABLET | Refills: 5 | Status: SHIPPED | OUTPATIENT
Start: 2019-11-14 | End: 2020-03-19 | Stop reason: SDUPTHER

## 2019-11-14 ASSESSMENT — ROUTINE ASSESSMENT OF PATIENT INDEX DATA (RAPID3)
PSYCHOLOGICAL DISTRESS SCORE: 0
FATIGUE SCORE: .5
AM STIFFNESS SCORE: 0, NO
PAIN SCORE: 0
TOTAL RAPID3 SCORE: 0
MDHAQ FUNCTION SCORE: 0
PATIENT GLOBAL ASSESSMENT SCORE: 0

## 2019-11-14 ASSESSMENT — SYSTEMIC LUPUS ERYTHEMATOSUS DISEASE ACTIVITY INDEX (SLEDAI): TOTAL_SCORE: 0

## 2019-11-14 NOTE — PROGRESS NOTES
Rapid3 Question Responses and Scores 11/11/2019   MDHAQ Score 0   Psychologic Score 0   Pain Score 0   When you awakened in the morning OVER THE LAST WEEK, did you feel stiff? No   If Yes, please indicate the number of hours until you are as limber as you will be for the day -   Fatigue Score 5   Global Health Score 0   RAPID3 Score 0

## 2019-11-14 NOTE — PROGRESS NOTES
Chief Complaint   Patient presents with    Follow-up       Patient with systemic lupus for a follow up  A patient of     History of presenting illness    44 year old female   She has had systemic lupus for 3 years  Joints + photosensitive skin rash + pleuropericarditis    She has been on plaquenil 200 mg bid    She has been symptom free for some time now    Eye exam feb 2019 nml    Labs     CBC,CMP,ESR,CRP,complements,dsdna and urine all normal    No complaints today    One episode of chest pain but that lasted briefly and attributed to stress of being in school  No sob usually    Past history : none    Family history : maternal cousin has lupus    Social history : current smoker      Review of Systems   Constitutional: Negative for activity change, appetite change, chills, diaphoresis, fatigue, fever and unexpected weight change.   HENT: Negative for congestion, dental problem, drooling, ear discharge, ear pain, facial swelling, hearing loss, mouth sores, nosebleeds, postnasal drip, rhinorrhea, sinus pressure, sinus pain, sneezing, sore throat, tinnitus, trouble swallowing and voice change.    Eyes: Negative for photophobia, pain, discharge, redness, itching and visual disturbance.   Respiratory: Negative for apnea, cough, choking, chest tightness, shortness of breath, wheezing and stridor.    Cardiovascular: Negative for chest pain, palpitations and leg swelling.   Gastrointestinal: Negative for abdominal distention, abdominal pain, anal bleeding, blood in stool, constipation, diarrhea, nausea, rectal pain and vomiting.   Endocrine: Negative for cold intolerance, heat intolerance, polydipsia, polyphagia and polyuria.   Genitourinary: Negative for decreased urine volume, difficulty urinating, dysuria, enuresis, flank pain, frequency, genital sores, hematuria and urgency.   Musculoskeletal: Negative for arthralgias, back pain, gait problem, joint swelling, myalgias, neck pain and neck stiffness.   Skin:  Negative for color change, pallor, rash and wound.   Allergic/Immunologic: Negative for environmental allergies, food allergies and immunocompromised state.   Neurological: Negative for dizziness, tremors, seizures, syncope, facial asymmetry, speech difficulty, weakness, light-headedness, numbness and headaches.   Hematological: Negative for adenopathy. Does not bruise/bleed easily.   Psychiatric/Behavioral: Negative for agitation, behavioral problems, confusion, decreased concentration, dysphoric mood, hallucinations, self-injury, sleep disturbance and suicidal ideas. The patient is not nervous/anxious and is not hyperactive.      Physical Exam     SUTTON-28 tender joint count: 0  SUTTON-28 swollen joint count: 0    Physical Exam   Constitutional: She is oriented to person, place, and time and well-developed, well-nourished, and in no distress. No distress.   HENT:   Head: Normocephalic.   Mouth/Throat: Oropharynx is clear and moist.   Eyes: Conjunctivae are normal. Pupils are equal, round, and reactive to light. Right eye exhibits no discharge. Left eye exhibits no discharge. No scleral icterus.   Neck: Normal range of motion. No thyromegaly present.   Cardiovascular: Normal rate, regular rhythm, normal heart sounds and intact distal pulses.    Pulmonary/Chest: Effort normal and breath sounds normal. No stridor.   Abdominal: Soft. Bowel sounds are normal.   Lymphadenopathy:     She has no cervical adenopathy.   Neurological: She is alert and oriented to person, place, and time.   Skin: Skin is warm. No rash noted. She is not diaphoretic.     Psychiatric: Affect and judgment normal.   Musculoskeletal: Normal range of motion.           Assessment     1. Other systemic lupus erythematosus with other organ involvement        44 year old female   She has had systemic lupus for 3 years  Joints + photosensitive skin rash + pleuropericarditis    She has been on plaquenil 200 mg bid    She has been symptom free for some time  now    Labs nml    dexa osteopenia in the left femoral neck  Low FRAX    F/u problem    Plan    Continue current meds    Quit smoking    Plaquenil eye exam    UTD with flu vaccine  She will check back about pneumonia 13/23 vaccines    Kehinde and vit d    Refills    Labs in 12 months     Rico was seen today for follow-up.    Diagnoses and all orders for this visit:    Other systemic lupus erythematosus with other organ involvement  -     hydroxychloroquine (PLAQUENIL) 200 mg tablet; Take 2 tablets (400 mg total) by mouth once daily.  -     CBC auto differential; Future  -     Comprehensive metabolic panel; Future  -     Sedimentation rate; Future  -     C-reactive protein; Future  -     Anti-DNA antibody, double-stranded; Future  -     C3 complement; Future  -     C4 complement; Future  -     Protein / creatinine ratio, urine; Future  -     Urinalysis; Future          Labs and rtc in 12 months

## 2020-03-19 ENCOUNTER — PATIENT MESSAGE (OUTPATIENT)
Dept: RHEUMATOLOGY | Facility: CLINIC | Age: 45
End: 2020-03-19

## 2020-03-19 DIAGNOSIS — M32.19 OTHER SYSTEMIC LUPUS ERYTHEMATOSUS WITH OTHER ORGAN INVOLVEMENT: ICD-10-CM

## 2020-03-20 DIAGNOSIS — M32.19 OTHER SYSTEMIC LUPUS ERYTHEMATOSUS WITH OTHER ORGAN INVOLVEMENT: ICD-10-CM

## 2020-03-20 RX ORDER — HYDROXYCHLOROQUINE SULFATE 200 MG/1
TABLET, FILM COATED ORAL
Qty: 60 TABLET | Refills: 5 | Status: SHIPPED | OUTPATIENT
Start: 2020-03-20 | End: 2020-03-20

## 2020-03-20 RX ORDER — HYDROXYCHLOROQUINE SULFATE 200 MG/1
TABLET, FILM COATED ORAL
Qty: 180 TABLET | Refills: 2 | Status: SHIPPED | OUTPATIENT
Start: 2020-03-20 | End: 2020-03-20

## 2020-03-20 RX ORDER — HYDROXYCHLOROQUINE SULFATE 200 MG/1
TABLET, FILM COATED ORAL
Qty: 180 TABLET | Refills: 2 | Status: SHIPPED | OUTPATIENT
Start: 2020-03-20 | End: 2020-11-16

## 2020-05-22 ENCOUNTER — PATIENT MESSAGE (OUTPATIENT)
Dept: RHEUMATOLOGY | Facility: CLINIC | Age: 45
End: 2020-05-22

## 2020-11-06 ENCOUNTER — LAB VISIT (OUTPATIENT)
Dept: LAB | Facility: HOSPITAL | Age: 45
End: 2020-11-06
Attending: INTERNAL MEDICINE
Payer: COMMERCIAL

## 2020-11-06 DIAGNOSIS — M32.19 OTHER SYSTEMIC LUPUS ERYTHEMATOSUS WITH OTHER ORGAN INVOLVEMENT: ICD-10-CM

## 2020-11-06 LAB
ALBUMIN SERPL BCP-MCNC: 4.2 G/DL (ref 3.5–5.2)
ALP SERPL-CCNC: 81 U/L (ref 55–135)
ALT SERPL W/O P-5'-P-CCNC: 23 U/L (ref 10–44)
ANION GAP SERPL CALC-SCNC: 10 MMOL/L (ref 8–16)
AST SERPL-CCNC: 21 U/L (ref 10–40)
BASOPHILS # BLD AUTO: 0.04 K/UL (ref 0–0.2)
BASOPHILS NFR BLD: 0.5 % (ref 0–1.9)
BILIRUB SERPL-MCNC: 0.3 MG/DL (ref 0.1–1)
BILIRUB UR QL STRIP: NEGATIVE
BUN SERPL-MCNC: 17 MG/DL (ref 6–20)
CALCIUM SERPL-MCNC: 9.5 MG/DL (ref 8.7–10.5)
CHLORIDE SERPL-SCNC: 105 MMOL/L (ref 95–110)
CLARITY UR: CLEAR
CO2 SERPL-SCNC: 25 MMOL/L (ref 23–29)
COLOR UR: YELLOW
CREAT SERPL-MCNC: 0.7 MG/DL (ref 0.5–1.4)
CREAT UR-MCNC: 21.2 MG/DL (ref 15–325)
DIFFERENTIAL METHOD: ABNORMAL
EOSINOPHIL # BLD AUTO: 0.2 K/UL (ref 0–0.5)
EOSINOPHIL NFR BLD: 2.4 % (ref 0–8)
ERYTHROCYTE [DISTWIDTH] IN BLOOD BY AUTOMATED COUNT: 11.9 % (ref 11.5–14.5)
ERYTHROCYTE [SEDIMENTATION RATE] IN BLOOD BY WESTERGREN METHOD: 38 MM/HR (ref 0–20)
EST. GFR  (AFRICAN AMERICAN): >60 ML/MIN/1.73 M^2
EST. GFR  (NON AFRICAN AMERICAN): >60 ML/MIN/1.73 M^2
GLUCOSE SERPL-MCNC: 71 MG/DL (ref 70–110)
GLUCOSE UR QL STRIP: NEGATIVE
HCT VFR BLD AUTO: 40.7 % (ref 37–48.5)
HGB BLD-MCNC: 13.7 G/DL (ref 12–16)
HGB UR QL STRIP: ABNORMAL
IMM GRANULOCYTES # BLD AUTO: 0.01 K/UL (ref 0–0.04)
IMM GRANULOCYTES NFR BLD AUTO: 0.1 % (ref 0–0.5)
KETONES UR QL STRIP: NEGATIVE
LEUKOCYTE ESTERASE UR QL STRIP: ABNORMAL
LYMPHOCYTES # BLD AUTO: 2.5 K/UL (ref 1–4.8)
LYMPHOCYTES NFR BLD: 33 % (ref 18–48)
MCH RBC QN AUTO: 32.6 PG (ref 27–31)
MCHC RBC AUTO-ENTMCNC: 33.7 G/DL (ref 32–36)
MCV RBC AUTO: 97 FL (ref 82–98)
MICROSCOPIC COMMENT: NORMAL
MONOCYTES # BLD AUTO: 1 K/UL (ref 0.3–1)
MONOCYTES NFR BLD: 12.8 % (ref 4–15)
NEUTROPHILS # BLD AUTO: 3.8 K/UL (ref 1.8–7.7)
NEUTROPHILS NFR BLD: 51.2 % (ref 38–73)
NITRITE UR QL STRIP: NEGATIVE
NRBC BLD-RTO: 0 /100 WBC
PH UR STRIP: 7 [PH] (ref 5–8)
PLATELET # BLD AUTO: 276 K/UL (ref 150–350)
PMV BLD AUTO: 9.8 FL (ref 9.2–12.9)
POTASSIUM SERPL-SCNC: 3.9 MMOL/L (ref 3.5–5.1)
PROT SERPL-MCNC: 7.4 G/DL (ref 6–8.4)
PROT UR QL STRIP: NEGATIVE
PROT UR-MCNC: <7 MG/DL (ref 0–15)
PROT/CREAT UR: NORMAL MG/G{CREAT} (ref 0–0.2)
RBC # BLD AUTO: 4.2 M/UL (ref 4–5.4)
RBC #/AREA URNS HPF: 1 /HPF (ref 0–4)
SODIUM SERPL-SCNC: 140 MMOL/L (ref 136–145)
SP GR UR STRIP: 1.01 (ref 1–1.03)
URN SPEC COLLECT METH UR: ABNORMAL
UROBILINOGEN UR STRIP-ACNC: NEGATIVE EU/DL
WBC # BLD AUTO: 7.48 K/UL (ref 3.9–12.7)
WBC #/AREA URNS HPF: 2 /HPF (ref 0–5)

## 2020-11-06 PROCEDURE — 36415 COLL VENOUS BLD VENIPUNCTURE: CPT

## 2020-11-06 PROCEDURE — 86160 COMPLEMENT ANTIGEN: CPT

## 2020-11-06 PROCEDURE — 86225 DNA ANTIBODY NATIVE: CPT | Mod: 59

## 2020-11-06 PROCEDURE — 80053 COMPREHEN METABOLIC PANEL: CPT

## 2020-11-06 PROCEDURE — 85651 RBC SED RATE NONAUTOMATED: CPT

## 2020-11-06 PROCEDURE — 81000 URINALYSIS NONAUTO W/SCOPE: CPT

## 2020-11-06 PROCEDURE — 82570 ASSAY OF URINE CREATININE: CPT

## 2020-11-06 PROCEDURE — 85025 COMPLETE CBC W/AUTO DIFF WBC: CPT

## 2020-11-06 PROCEDURE — 86160 COMPLEMENT ANTIGEN: CPT | Mod: 59

## 2020-11-06 PROCEDURE — 86140 C-REACTIVE PROTEIN: CPT

## 2020-11-06 PROCEDURE — 86225 DNA ANTIBODY NATIVE: CPT

## 2020-11-07 LAB
C3 SERPL-MCNC: 121 MG/DL (ref 50–180)
C4 SERPL-MCNC: 23 MG/DL (ref 11–44)
CRP SERPL-MCNC: 5.1 MG/L (ref 0–8.2)

## 2020-11-10 LAB
DNA TITER: NORMAL
DSDNA AB SER-ACNC: POSITIVE [IU]/ML

## 2020-11-16 ENCOUNTER — OFFICE VISIT (OUTPATIENT)
Dept: RHEUMATOLOGY | Facility: CLINIC | Age: 45
End: 2020-11-16
Payer: COMMERCIAL

## 2020-11-16 VITALS
DIASTOLIC BLOOD PRESSURE: 78 MMHG | SYSTOLIC BLOOD PRESSURE: 136 MMHG | HEART RATE: 74 BPM | HEIGHT: 66 IN | WEIGHT: 170 LBS | BODY MASS INDEX: 27.32 KG/M2

## 2020-11-16 DIAGNOSIS — M32.19 OTHER SYSTEMIC LUPUS ERYTHEMATOSUS WITH OTHER ORGAN INVOLVEMENT: ICD-10-CM

## 2020-11-16 PROCEDURE — 99214 PR OFFICE/OUTPT VISIT, EST, LEVL IV, 30-39 MIN: ICD-10-PCS | Mod: S$GLB,,, | Performed by: INTERNAL MEDICINE

## 2020-11-16 PROCEDURE — 99999 PR PBB SHADOW E&M-EST. PATIENT-LVL III: ICD-10-PCS | Mod: PBBFAC,,, | Performed by: INTERNAL MEDICINE

## 2020-11-16 PROCEDURE — 99999 PR PBB SHADOW E&M-EST. PATIENT-LVL III: CPT | Mod: PBBFAC,,, | Performed by: INTERNAL MEDICINE

## 2020-11-16 PROCEDURE — 99214 OFFICE O/P EST MOD 30 MIN: CPT | Mod: S$GLB,,, | Performed by: INTERNAL MEDICINE

## 2020-11-16 RX ORDER — HYDROXYCHLOROQUINE SULFATE 200 MG/1
TABLET, FILM COATED ORAL
Qty: 180 TABLET | Refills: 4 | Status: SHIPPED | OUTPATIENT
Start: 2020-11-16 | End: 2021-05-25

## 2020-11-16 RX ORDER — PREDNISONE 2.5 MG/1
TABLET ORAL
Qty: 90 TABLET | Refills: 0 | Status: SHIPPED | OUTPATIENT
Start: 2020-11-16 | End: 2021-11-23

## 2020-11-16 RX ORDER — BUPROPION HYDROCHLORIDE 150 MG/1
150 TABLET ORAL DAILY
Qty: 30 TABLET | Refills: 11 | Status: SHIPPED | OUTPATIENT
Start: 2020-11-16 | End: 2021-05-25

## 2020-11-16 RX ORDER — MUPIROCIN 20 MG/G
OINTMENT TOPICAL
COMMUNITY
Start: 2020-08-19

## 2020-11-16 RX ORDER — CHOLECALCIFEROL (VITAMIN D3) 25 MCG
1000 TABLET ORAL DAILY
COMMUNITY

## 2020-11-16 ASSESSMENT — SYSTEMIC LUPUS ERYTHEMATOSUS DISEASE ACTIVITY INDEX (SLEDAI): TOTAL_SCORE: 2

## 2020-11-16 NOTE — PROGRESS NOTES
Chief Complaint   Patient presents with    Disease Management    Lupus       Patient with systemic lupus for a follow up      History of presenting illness    45 year old female   She has had systemic lupus for 3 years  Joints + photosensitive skin rash + pleuropericarditis    She has been on plaquenil 200 mg bid    She has been symptom free for some time now    Eye exam feb 2019 last one  Needs rescheduling     2 episodes of joint pain in the past year  Needed only 2.5 mg prednisone each time  But otherwise she has done well    11/2020    nml CBC  nml CMP  ESR 38,this was highest  CRP nml  Dsdna positive,1:10  nml complements    Last time : she had mentioned   One episode of chest pain but that lasted briefly and attributed to stress of being in school  No sob usually    Past history : none    Family history : maternal cousin has lupus    Social history : current smoker      Review of Systems   Constitutional: Negative for activity change, appetite change, chills, diaphoresis, fatigue, fever and unexpected weight change.   HENT: Negative for congestion, dental problem, drooling, ear discharge, ear pain, facial swelling, hearing loss, mouth sores, nosebleeds, postnasal drip, rhinorrhea, sinus pressure, sinus pain, sneezing, sore throat, tinnitus, trouble swallowing and voice change.    Eyes: Negative for photophobia, pain, discharge, redness, itching and visual disturbance.   Respiratory: Negative for apnea, cough, choking, chest tightness, shortness of breath, wheezing and stridor.    Cardiovascular: Negative for chest pain, palpitations and leg swelling.   Gastrointestinal: Negative for abdominal distention, abdominal pain, anal bleeding, blood in stool, constipation, diarrhea, nausea, rectal pain and vomiting.   Endocrine: Negative for cold intolerance, heat intolerance, polydipsia, polyphagia and polyuria.   Genitourinary: Negative for decreased urine volume, difficulty urinating, dysuria, enuresis, flank  pain, frequency, genital sores, hematuria and urgency.   Musculoskeletal: Negative for arthralgias, back pain, gait problem, joint swelling, myalgias, neck pain and neck stiffness.   Skin: Negative for color change, pallor, rash and wound.   Allergic/Immunologic: Negative for environmental allergies, food allergies and immunocompromised state.   Neurological: Negative for dizziness, tremors, seizures, syncope, facial asymmetry, speech difficulty, weakness, light-headedness, numbness and headaches.   Hematological: Negative for adenopathy. Does not bruise/bleed easily.   Psychiatric/Behavioral: Negative for agitation, behavioral problems, confusion, decreased concentration, dysphoric mood, hallucinations, self-injury, sleep disturbance and suicidal ideas. The patient is not nervous/anxious and is not hyperactive.      Physical Exam     SUTTON-28 tender joint count: 0  SUTTON-28 swollen joint count: 0    Physical Exam   Constitutional: She is oriented to person, place, and time and well-developed, well-nourished, and in no distress. No distress.   HENT:   Head: Normocephalic.   Mouth/Throat: Oropharynx is clear and moist.   Eyes: Conjunctivae are normal. Pupils are equal, round, and reactive to light. Right eye exhibits no discharge. Left eye exhibits no discharge. No scleral icterus.   Neck: Normal range of motion. No thyromegaly present.   Cardiovascular: Normal rate, regular rhythm, normal heart sounds and intact distal pulses.    Pulmonary/Chest: Effort normal and breath sounds normal. No stridor.   Abdominal: Soft. Bowel sounds are normal.   Lymphadenopathy:     She has no cervical adenopathy.   Neurological: She is alert and oriented to person, place, and time.   Skin: Skin is warm. No rash noted. She is not diaphoretic.     Psychiatric: Affect and judgment normal.   Musculoskeletal: Normal range of motion.           Assessment     1. Other systemic lupus erythematosus with other organ involvement        45 year old  female   She has had systemic lupus for 3 years  Joints + photosensitive skin rash + pleuropericarditis    She has been on plaquenil 200 mg bid    2 episodes of joint flares  One with objective evidence : high ESR,dsdna titre  Symptoms resolved with one tab on 2.5 mg prednisone only     Labs nml otherwise     dexa osteopenia in the left femoral neck  Low FRAX    F/u problem    Plan    Continue current meds    Quit smoking    Plaquenil eye exam    flu vaccine  pneumonia 13/23 vaccines    Kehinde and vit d    Refills  Plaquenil and prednisone    If more flares : will need to add a second agent     Labs in 6 months    Rioc was seen today for disease management and lupus.    Diagnoses and all orders for this visit:    Other systemic lupus erythematosus with other organ involvement  -     hydrOXYchloroQUINE (PLAQUENIL) 200 mg tablet; Take 2 tablets (400 mg total) by mouth once daily.  -     CBC Auto Differential; Future  -     Comprehensive Metabolic Panel; Future  -     C-Reactive Protein; Future  -     Sedimentation rate; Future  -     Anti-DNA Ab, Double-Stranded; Future  -     C3 Complement; Future  -     C4 Complement; Future  -     Protein/Creatinine Ratio, Urine; Future  -     Urinalysis; Future    Other orders  -     predniSONE (DELTASONE) 2.5 MG tablet; 1 to 2 tabs daily for 3 to 5 days only if needed for lupus flares  -     buPROPion (WELLBUTRIN XL) 150 MG TB24 tablet; Take 1 tablet (150 mg total) by mouth once daily.          Labs and rtc in 12 months                   Improved

## 2020-11-16 NOTE — PROGRESS NOTES
Rapid3 Question Responses and Scores 11/13/2020   MDHAQ Score 0   Psychologic Score 0   Pain Score 0   When you awakened in the morning OVER THE LAST WEEK, did you feel stiff? No   If Yes, please indicate the number of hours until you are as limber as you will be for the day -   Fatigue Score 3.5   Global Health Score 0   RAPID3 Score 0         Answers for HPI/ROS submitted by the patient on 11/13/2020   fever: No  eye redness: No  mouth sores: No  headaches: No  shortness of breath: No  chest pain: No  trouble swallowing: No  diarrhea: No  constipation: No  unexpected weight change: No  genital sore: No  dysuria: No  During the last 3 days, have you had a skin rash?: No  Bruises or bleeds easily: No  cough: No

## 2020-12-28 ENCOUNTER — PATIENT MESSAGE (OUTPATIENT)
Dept: RHEUMATOLOGY | Facility: CLINIC | Age: 45
End: 2020-12-28

## 2021-02-23 ENCOUNTER — PATIENT MESSAGE (OUTPATIENT)
Dept: RHEUMATOLOGY | Facility: CLINIC | Age: 46
End: 2021-02-23

## 2021-04-29 NOTE — TELEPHONE ENCOUNTER
Pt. Called reporting that she has been having  Mild SOB and Bilateral shoulder pain X 2 days, and reported that she was told to call Dr. Melendez if she had any issues. I verbalized to the patient to go to nearest ER if SOB increased, and I would give Dr. Melendez the messaage, and someone would call her back. Pt. Verbalized understanding.   Cyclosporine Pregnancy And Lactation Text: This medication is Pregnancy Category C and it isn't know if it is safe during pregnancy. This medication is excreted in breast milk.

## 2021-05-17 ENCOUNTER — PATIENT MESSAGE (OUTPATIENT)
Dept: RHEUMATOLOGY | Facility: CLINIC | Age: 46
End: 2021-05-17

## 2021-05-25 ENCOUNTER — OFFICE VISIT (OUTPATIENT)
Dept: RHEUMATOLOGY | Facility: CLINIC | Age: 46
End: 2021-05-25
Payer: COMMERCIAL

## 2021-05-25 DIAGNOSIS — M32.19 OTHER SYSTEMIC LUPUS ERYTHEMATOSUS WITH OTHER ORGAN INVOLVEMENT: ICD-10-CM

## 2021-05-25 DIAGNOSIS — M85.89 OSTEOPENIA OF MULTIPLE SITES: Primary | ICD-10-CM

## 2021-05-25 PROCEDURE — 99214 OFFICE O/P EST MOD 30 MIN: CPT | Mod: 95,,, | Performed by: INTERNAL MEDICINE

## 2021-05-25 PROCEDURE — 99214 PR OFFICE/OUTPT VISIT, EST, LEVL IV, 30-39 MIN: ICD-10-PCS | Mod: 95,,, | Performed by: INTERNAL MEDICINE

## 2021-05-25 RX ORDER — HYDROXYCHLOROQUINE SULFATE 200 MG/1
TABLET, FILM COATED ORAL
Qty: 180 TABLET | Refills: 4 | Status: SHIPPED | OUTPATIENT
Start: 2021-05-25 | End: 2021-11-23

## 2021-05-25 RX ORDER — BUPROPION HYDROCHLORIDE 150 MG/1
150 TABLET ORAL DAILY
Qty: 30 TABLET | Refills: 6 | Status: SHIPPED | OUTPATIENT
Start: 2021-05-25 | End: 2021-11-23

## 2021-05-25 ASSESSMENT — SYSTEMIC LUPUS ERYTHEMATOSUS DISEASE ACTIVITY INDEX (SLEDAI): TOTAL_SCORE: 2

## 2021-06-25 ENCOUNTER — HOSPITAL ENCOUNTER (OUTPATIENT)
Dept: RADIOLOGY | Facility: HOSPITAL | Age: 46
Discharge: HOME | End: 2021-06-25
Attending: INTERNAL MEDICINE
Payer: COMMERCIAL

## 2021-06-25 DIAGNOSIS — M85.89 OSTEOPENIA OF MULTIPLE SITES: ICD-10-CM

## 2021-06-25 PROCEDURE — 77080 DEXA BONE DENSITY SPINE HIP: ICD-10-PCS | Mod: 26,,, | Performed by: RADIOLOGY

## 2021-06-25 PROCEDURE — 77080 DXA BONE DENSITY AXIAL: CPT | Mod: TC

## 2021-06-25 PROCEDURE — 77080 DXA BONE DENSITY AXIAL: CPT | Mod: 26,,, | Performed by: RADIOLOGY

## 2021-11-10 DIAGNOSIS — M32.9 SYSTEMIC LUPUS ERYTHEMATOSUS, UNSPECIFIED SLE TYPE, UNSPECIFIED ORGAN INVOLVEMENT STATUS: Primary | ICD-10-CM

## 2021-11-18 ENCOUNTER — PATIENT MESSAGE (OUTPATIENT)
Dept: RHEUMATOLOGY | Facility: CLINIC | Age: 46
End: 2021-11-18
Payer: COMMERCIAL

## 2021-11-23 ENCOUNTER — OFFICE VISIT (OUTPATIENT)
Dept: RHEUMATOLOGY | Facility: CLINIC | Age: 46
End: 2021-11-23
Payer: COMMERCIAL

## 2021-11-23 DIAGNOSIS — M32.19 OTHER SYSTEMIC LUPUS ERYTHEMATOSUS WITH OTHER ORGAN INVOLVEMENT: ICD-10-CM

## 2021-11-23 PROCEDURE — 99214 OFFICE O/P EST MOD 30 MIN: CPT | Mod: 95,,, | Performed by: INTERNAL MEDICINE

## 2021-11-23 PROCEDURE — 99214 PR OFFICE/OUTPT VISIT, EST, LEVL IV, 30-39 MIN: ICD-10-PCS | Mod: 95,,, | Performed by: INTERNAL MEDICINE

## 2021-11-23 RX ORDER — BUPROPION HYDROCHLORIDE 150 MG/1
150 TABLET ORAL DAILY
Qty: 30 TABLET | Refills: 6 | Status: SHIPPED | OUTPATIENT
Start: 2021-11-23 | End: 2022-11-23

## 2021-11-23 RX ORDER — HYDROXYCHLOROQUINE SULFATE 200 MG/1
TABLET, FILM COATED ORAL
Qty: 180 TABLET | Refills: 4 | Status: SHIPPED | OUTPATIENT
Start: 2021-11-23 | End: 2022-07-13

## 2021-11-23 ASSESSMENT — SYSTEMIC LUPUS ERYTHEMATOSUS DISEASE ACTIVITY INDEX (SLEDAI): TOTAL_SCORE: 0

## 2022-05-26 ENCOUNTER — PATIENT MESSAGE (OUTPATIENT)
Dept: RHEUMATOLOGY | Facility: CLINIC | Age: 47
End: 2022-05-26
Payer: COMMERCIAL

## 2022-07-13 ENCOUNTER — OFFICE VISIT (OUTPATIENT)
Dept: RHEUMATOLOGY | Facility: CLINIC | Age: 47
End: 2022-07-13
Payer: COMMERCIAL

## 2022-07-13 DIAGNOSIS — M32.19 OTHER SYSTEMIC LUPUS ERYTHEMATOSUS WITH OTHER ORGAN INVOLVEMENT: ICD-10-CM

## 2022-07-13 PROCEDURE — 1159F PR MEDICATION LIST DOCUMENTED IN MEDICAL RECORD: ICD-10-PCS | Mod: CPTII,95,, | Performed by: INTERNAL MEDICINE

## 2022-07-13 PROCEDURE — 99214 OFFICE O/P EST MOD 30 MIN: CPT | Mod: 95,,, | Performed by: INTERNAL MEDICINE

## 2022-07-13 PROCEDURE — 1160F RVW MEDS BY RX/DR IN RCRD: CPT | Mod: CPTII,95,, | Performed by: INTERNAL MEDICINE

## 2022-07-13 PROCEDURE — 99214 PR OFFICE/OUTPT VISIT, EST, LEVL IV, 30-39 MIN: ICD-10-PCS | Mod: 95,,, | Performed by: INTERNAL MEDICINE

## 2022-07-13 PROCEDURE — 1160F PR REVIEW ALL MEDS BY PRESCRIBER/CLIN PHARMACIST DOCUMENTED: ICD-10-PCS | Mod: CPTII,95,, | Performed by: INTERNAL MEDICINE

## 2022-07-13 PROCEDURE — 1159F MED LIST DOCD IN RCRD: CPT | Mod: CPTII,95,, | Performed by: INTERNAL MEDICINE

## 2022-07-13 RX ORDER — HYDROXYCHLOROQUINE SULFATE 200 MG/1
TABLET, FILM COATED ORAL
Qty: 180 TABLET | Refills: 4 | Status: SHIPPED | OUTPATIENT
Start: 2022-07-13 | End: 2023-03-23

## 2022-07-13 ASSESSMENT — ROUTINE ASSESSMENT OF PATIENT INDEX DATA (RAPID3)
MDHAQ FUNCTION SCORE: 0
PSYCHOLOGICAL DISTRESS SCORE: 0
AM STIFFNESS SCORE: 0, NO
TOTAL RAPID3 SCORE: 0
FATIGUE SCORE: 0
PAIN SCORE: 0
PATIENT GLOBAL ASSESSMENT SCORE: 0

## 2022-07-13 ASSESSMENT — SYSTEMIC LUPUS ERYTHEMATOSUS DISEASE ACTIVITY INDEX (SLEDAI): TOTAL_SCORE: 0

## 2022-07-13 NOTE — PROGRESS NOTES
Rapid3 Question Responses and Scores 7/12/2022   MDHAQ Score 0   Psychologic Score 0   Pain Score 0   When you awakened in the morning OVER THE LAST WEEK, did you feel stiff? No   If Yes, please indicate the number of hours until you are as limber as you will be for the day -   Fatigue Score 0   Global Health Score 0   RAPID3 Score 0         Answers for HPI/ROS submitted by the patient on 7/12/2022  fever: No  eye redness: No  mouth sores: No  headaches: No  shortness of breath: No  chest pain: No  trouble swallowing: No  diarrhea: No  constipation: No  unexpected weight change: No  genital sore: No  dysuria: No  During the last 3 days, have you had a skin rash?: No  Bruises or bleeds easily: No  cough: No

## 2022-07-13 NOTE — PROGRESS NOTES
Chief Complaint   Patient presents with    Disease Management       Patient with systemic lupus for a follow up    The patient location is:home  The chief complaint leading to consultation is: systemic lupus    Visit type:audiovisual    Face to Face time with patient: 20   minutes of total time spent on the encounter, which includes face to face time and non-face to face time preparing to see the patient (eg, review of tests), Obtaining and/or reviewing separately obtained history, Documenting clinical information in the electronic or other health record, Independently interpreting results (not separately reported) and communicating results to the patient/family/caregiver, or Care coordination (not separately reported).         Each patient to whom he or she provides medical services by telemedicine is:  (1) informed of the relationship between the physician and patient and the respective role of any other health care provider with respect to management of the patient; and (2) notified that he or she may decline to receive medical services by telemedicine and may withdraw from such care at any time.    Notes:         History of presenting illness    46 year old female     She has had systemic lupus for 4 years  Joints + photosensitive skin rash + pleuropericarditis    She has been on plaquenil 200 mg bid    She has been symptom free for some time now    Eye exam feb 2019 last one    2 episodes of joint pain in the past year  Needed only 2.5 mg prednisone each time  But otherwise she has done well    5/2021    nml CBC  nml CMP  ESR 4  CRP nml  Dsdna positive,1:80  nml complements    Really has no symptoms today    One episode of chest pain but that lasted briefly and attributed to stress of being in school  No sob usually    11/2021    No complaints today  No joint pains  No chest pains    Labs  nml CBC,CMP,ESR,CRP  nml complements    UA 1+ occult blood  She will talk to gyn  Protein-urine-absent    Dsdna 1:  20      6/2022    No complaints today  No joint pains  No chest pains    UA,UPCR nml  nml complements  ESR,CRP nml  Dsdna 1: 40  Vit d nml  CMP nml  CBC nml        Past history : none    Family history : maternal cousin has lupus    Social history : current smoker      Review of Systems   Constitutional: Negative for activity change, appetite change, chills, diaphoresis, fatigue, fever and unexpected weight change.   HENT: Negative for congestion, dental problem, drooling, ear discharge, ear pain, facial swelling, hearing loss, mouth sores, nosebleeds, postnasal drip, rhinorrhea, sinus pressure, sinus pain, sneezing, sore throat, tinnitus, trouble swallowing and voice change.    Eyes: Negative for photophobia, pain, discharge, redness, itching and visual disturbance.   Respiratory: Negative for apnea, cough, choking, chest tightness, shortness of breath, wheezing and stridor.    Cardiovascular: Negative for chest pain, palpitations and leg swelling.   Gastrointestinal: Negative for abdominal distention, abdominal pain, anal bleeding, blood in stool, constipation, diarrhea, nausea, rectal pain and vomiting.   Endocrine: Negative for cold intolerance, heat intolerance, polydipsia, polyphagia and polyuria.   Genitourinary: Negative for decreased urine volume, difficulty urinating, dysuria, enuresis, flank pain, frequency, genital sores, hematuria and urgency.   Musculoskeletal: Negative for arthralgias, back pain, gait problem, joint swelling, myalgias, neck pain and neck stiffness.   Skin: Negative for color change, pallor, rash and wound.   Allergic/Immunologic: Negative for environmental allergies, food allergies and immunocompromised state.   Neurological: Negative for dizziness, tremors, seizures, syncope, facial asymmetry, speech difficulty, weakness, light-headedness, numbness and headaches.   Hematological: Negative for adenopathy. Does not bruise/bleed easily.   Psychiatric/Behavioral: Negative for agitation,  behavioral problems, confusion, decreased concentration, dysphoric mood, hallucinations, self-injury, sleep disturbance and suicidal ideas. The patient is not nervous/anxious and is not hyperactive.      Physical Exam  Physical Exam   Constitutional: She is oriented to person, place, and time. No distress.   HENT:   Head: Normocephalic.   Mouth/Throat: Oropharynx is clear and moist.   Eyes: Pupils are equal, round, and reactive to light. Conjunctivae are normal. Right eye exhibits no discharge. Left eye exhibits no discharge. No scleral icterus.   Neck: No thyromegaly present.   Cardiovascular: Normal rate, regular rhythm and normal heart sounds.   Pulmonary/Chest: Effort normal and breath sounds normal. No stridor.   Abdominal: Soft. Bowel sounds are normal.   Musculoskeletal:         General: Normal range of motion.      Cervical back: Normal range of motion.   Lymphadenopathy:     She has no cervical adenopathy.   Neurological: She is alert and oriented to person, place, and time.   Skin: Skin is warm. No rash noted. She is not diaphoretic.   Psychiatric: Affect and judgment normal.         Assessment     1. Other systemic lupus erythematosus with other organ involvement        46 year old female   She has had systemic lupus for 3 years  Joints + photosensitive skin rash + pleuropericarditis    She has been on plaquenil 200 mg bid    2 episodes of joint flares  One with objective evidence : high ESR,dsdna titre  Symptoms resolved with one tab on 2.5 mg prednisone only     dexa osteopenia in the left femoral neck  Low FRAX- 2021      F/u problem    Plan    Continue current meds    Quit smoking-    Plaquenil eye exam 2022 UTD    VACCINES -   Flu,pneumonia,covidx 3    Kehinde 1200 mg and vit d 2000 IU  VIT D NML  DEXA can be rpted in 2023    Refills  Plaquenil     Labs in 6 months    Rico was seen today for disease management.    Diagnoses and all orders for this visit:    Other systemic lupus erythematosus with other  organ involvement  -     hydrOXYchloroQUINE (PLAQUENIL) 200 mg tablet; Take 2 tablets (400 mg total) by mouth once daily.  -     CBC Auto Differential; Future  -     Comprehensive Metabolic Panel; Future  -     Sedimentation rate; Future  -     C-Reactive Protein; Future  -     C3 Complement; Future  -     C4 Complement; Future  -     Anti-DNA Ab, Double-Stranded; Future  -     Protein/Creatinine Ratio, Urine; Future  -     Urinalysis; Future          Labs and rtc in 6 months

## 2023-03-23 ENCOUNTER — OFFICE VISIT (OUTPATIENT)
Dept: RHEUMATOLOGY | Facility: CLINIC | Age: 48
End: 2023-03-23
Payer: COMMERCIAL

## 2023-03-23 DIAGNOSIS — F17.200 NICOTINE DEPENDENCE, UNCOMPLICATED, UNSPECIFIED NICOTINE PRODUCT TYPE: Primary | ICD-10-CM

## 2023-03-23 DIAGNOSIS — M32.19 OTHER SYSTEMIC LUPUS ERYTHEMATOSUS WITH OTHER ORGAN INVOLVEMENT: ICD-10-CM

## 2023-03-23 PROCEDURE — 99214 OFFICE O/P EST MOD 30 MIN: CPT | Mod: 95,,, | Performed by: INTERNAL MEDICINE

## 2023-03-23 PROCEDURE — 1159F MED LIST DOCD IN RCRD: CPT | Mod: CPTII,95,, | Performed by: INTERNAL MEDICINE

## 2023-03-23 PROCEDURE — 1160F PR REVIEW ALL MEDS BY PRESCRIBER/CLIN PHARMACIST DOCUMENTED: ICD-10-PCS | Mod: CPTII,95,, | Performed by: INTERNAL MEDICINE

## 2023-03-23 PROCEDURE — 1160F RVW MEDS BY RX/DR IN RCRD: CPT | Mod: CPTII,95,, | Performed by: INTERNAL MEDICINE

## 2023-03-23 PROCEDURE — 99214 PR OFFICE/OUTPT VISIT, EST, LEVL IV, 30-39 MIN: ICD-10-PCS | Mod: 95,,, | Performed by: INTERNAL MEDICINE

## 2023-03-23 PROCEDURE — 1159F PR MEDICATION LIST DOCUMENTED IN MEDICAL RECORD: ICD-10-PCS | Mod: CPTII,95,, | Performed by: INTERNAL MEDICINE

## 2023-03-23 RX ORDER — HYDROXYCHLOROQUINE SULFATE 200 MG/1
TABLET, FILM COATED ORAL
Qty: 180 TABLET | Refills: 4 | Status: SHIPPED | OUTPATIENT
Start: 2023-03-23

## 2023-03-23 ASSESSMENT — SYSTEMIC LUPUS ERYTHEMATOSUS DISEASE ACTIVITY INDEX (SLEDAI): TOTAL_SCORE: 0

## 2023-03-23 NOTE — PROGRESS NOTES
Chief Complaint   Patient presents with    Disease Management       Patient with systemic lupus for a follow up    The patient location is:home  The chief complaint leading to consultation is: systemic lupus    Visit type:audiovisual    Face to Face time with patient: 20   minutes of total time spent on the encounter, which includes face to face time and non-face to face time preparing to see the patient (eg, review of tests), Obtaining and/or reviewing separately obtained history, Documenting clinical information in the electronic or other health record, Independently interpreting results (not separately reported) and communicating results to the patient/family/caregiver, or Care coordination (not separately reported).         Each patient to whom he or she provides medical services by telemedicine is:  (1) informed of the relationship between the physician and patient and the respective role of any other health care provider with respect to management of the patient; and (2) notified that he or she may decline to receive medical services by telemedicine and may withdraw from such care at any time.    Notes:         History of presenting illness    46 year old female     She has had systemic lupus for 4 years  Joints + photosensitive skin rash + pleuropericarditis    She has been on plaquenil 200 mg bid    She has been symptom free for some time now    Eye exam feb 2019 last one    2 episodes of joint pain in the past year  Needed only 2.5 mg prednisone each time  But otherwise she has done well    5/2021    nml CBC  nml CMP  ESR 4  CRP nml  Dsdna positive,1:80  nml complements    Really has no symptoms today    One episode of chest pain but that lasted briefly and attributed to stress of being in school  No sob usually    11/2021    No complaints today  No joint pains  No chest pains    Labs  nml CBC,CMP,ESR,CRP  nml complements    UA 1+ occult blood  She will talk to gyn  Protein-urine-absent    Dsdna 1:  20      6/2022    No complaints today  No joint pains  No chest pains    UA,UPCR nml  nml complements  ESR,CRP nml  Dsdna 1: 40  Vit d nml  CMP nml  CBC nml      3/2023    No complaints today  Chest pains never came back    Labs   CBC,CMP,ESR,CRP,complements  Dsdna 1: 20  UA,UPCR nml    Plaquenil 200 mg bid        Past history : none    Family history : maternal cousin has lupus    Social history : current smoker      Review of Systems   Constitutional:  Negative for activity change, appetite change, chills, diaphoresis, fatigue, fever and unexpected weight change.   HENT:  Negative for congestion, dental problem, drooling, ear discharge, ear pain, facial swelling, hearing loss, mouth sores, nosebleeds, postnasal drip, rhinorrhea, sinus pressure, sinus pain, sneezing, sore throat, tinnitus, trouble swallowing and voice change.    Eyes:  Negative for photophobia, pain, discharge, redness, itching and visual disturbance.   Respiratory:  Negative for apnea, cough, choking, chest tightness, shortness of breath, wheezing and stridor.    Cardiovascular:  Negative for chest pain, palpitations and leg swelling.   Gastrointestinal:  Negative for abdominal distention, abdominal pain, anal bleeding, blood in stool, constipation, diarrhea, nausea, rectal pain and vomiting.   Endocrine: Negative for cold intolerance, heat intolerance, polydipsia, polyphagia and polyuria.   Genitourinary:  Negative for decreased urine volume, difficulty urinating, dysuria, enuresis, flank pain, frequency, genital sores, hematuria and urgency.   Musculoskeletal:  Negative for arthralgias, back pain, gait problem, joint swelling, myalgias, neck pain and neck stiffness.   Skin:  Negative for color change, pallor, rash and wound.   Allergic/Immunologic: Negative for environmental allergies, food allergies and immunocompromised state.   Neurological:  Negative for dizziness, tremors, seizures, syncope, facial asymmetry, speech difficulty, weakness,  light-headedness, numbness and headaches.   Hematological:  Negative for adenopathy. Does not bruise/bleed easily.   Psychiatric/Behavioral:  Negative for agitation, behavioral problems, confusion, decreased concentration, dysphoric mood, hallucinations, self-injury, sleep disturbance and suicidal ideas. The patient is not nervous/anxious and is not hyperactive.    Physical Exam  Physical Exam   Constitutional: She is oriented to person, place, and time. No distress.   HENT:   Head: Normocephalic.   Mouth/Throat: Oropharynx is clear and moist.   Eyes: Pupils are equal, round, and reactive to light. Conjunctivae are normal. Right eye exhibits no discharge. Left eye exhibits no discharge. No scleral icterus.   Neck: No thyromegaly present.   Cardiovascular: Normal rate, regular rhythm and normal heart sounds.   Pulmonary/Chest: Effort normal and breath sounds normal. No stridor.   Abdominal: Soft. Bowel sounds are normal.   Musculoskeletal:         General: Normal range of motion.      Cervical back: Normal range of motion.   Lymphadenopathy:     She has no cervical adenopathy.   Neurological: She is alert and oriented to person, place, and time.   Skin: Skin is warm. No rash noted. She is not diaphoretic.   Psychiatric: Affect and judgment normal.       Assessment     1. Nicotine dependence, uncomplicated, unspecified nicotine product type    2. Other systemic lupus erythematosus with other organ involvement          47 year old female   She has had systemic lupus for 3 years  Joints + photosensitive skin rash + pleuropericarditis    She has been on plaquenil 200 mg bid    2 episodes of joint flares  One with objective evidence : high ESR,dsdna titre  Symptoms resolved with one tab on 2.5 mg prednisone only     dexa osteopenia in the left femoral neck  Low FRAX- 2021      F/u problem    Plan    Continue current meds    Quit smoking-    Plaquenil eye exam march 2023    VACCINES -   Flu,pneumonia,covidx 3    Kehinde 1200  mg and vit d 2000 IU  VIT D NML  DEXA can be rpted in 2023    Refills  Plaquenil     Labs in 12 months    Rico was seen today for disease management.    Diagnoses and all orders for this visit:    Nicotine dependence, uncomplicated, unspecified nicotine product type  -     Ambulatory referral/consult to Smoking Cessation Program; Future    Other systemic lupus erythematosus with other organ involvement  -     hydrOXYchloroQUINE (PLAQUENIL) 200 mg tablet; Take 2 tablets (400 mg total) by mouth once daily.  -     CBC Auto Differential; Future  -     Comprehensive Metabolic Panel; Future  -     Sedimentation rate; Future  -     C-Reactive Protein; Future  -     Anti-DNA Ab, Double-Stranded; Future  -     C3 Complement; Future  -     C4 Complement; Future  -     Protein/Creatinine Ratio, Urine; Future  -     Urinalysis; Future        Labs and rtc in 12 months

## 2024-03-20 ENCOUNTER — PATIENT MESSAGE (OUTPATIENT)
Dept: RHEUMATOLOGY | Facility: CLINIC | Age: 49
End: 2024-03-20
Payer: COMMERCIAL

## 2024-03-28 ENCOUNTER — PATIENT MESSAGE (OUTPATIENT)
Dept: RHEUMATOLOGY | Facility: CLINIC | Age: 49
End: 2024-03-28
Payer: COMMERCIAL

## 2024-04-10 ENCOUNTER — PATIENT MESSAGE (OUTPATIENT)
Dept: RHEUMATOLOGY | Facility: CLINIC | Age: 49
End: 2024-04-10
Payer: COMMERCIAL

## 2024-04-11 DIAGNOSIS — M32.19 OTHER SYSTEMIC LUPUS ERYTHEMATOSUS WITH OTHER ORGAN INVOLVEMENT: Primary | ICD-10-CM

## 2024-05-09 ENCOUNTER — OFFICE VISIT (OUTPATIENT)
Dept: RHEUMATOLOGY | Facility: CLINIC | Age: 49
End: 2024-05-09
Payer: COMMERCIAL

## 2024-05-09 VITALS — BODY MASS INDEX: 25.34 KG/M2 | WEIGHT: 157 LBS

## 2024-05-09 DIAGNOSIS — M85.89 OSTEOPENIA OF MULTIPLE SITES: Primary | ICD-10-CM

## 2024-05-09 DIAGNOSIS — M32.19 OTHER SYSTEMIC LUPUS ERYTHEMATOSUS WITH OTHER ORGAN INVOLVEMENT: ICD-10-CM

## 2024-05-09 DIAGNOSIS — F17.200 NICOTINE DEPENDENCE, UNCOMPLICATED, UNSPECIFIED NICOTINE PRODUCT TYPE: ICD-10-CM

## 2024-05-09 PROCEDURE — 3008F BODY MASS INDEX DOCD: CPT | Mod: CPTII,95,, | Performed by: INTERNAL MEDICINE

## 2024-05-09 PROCEDURE — 1159F MED LIST DOCD IN RCRD: CPT | Mod: CPTII,95,, | Performed by: INTERNAL MEDICINE

## 2024-05-09 PROCEDURE — 1160F RVW MEDS BY RX/DR IN RCRD: CPT | Mod: CPTII,95,, | Performed by: INTERNAL MEDICINE

## 2024-05-09 PROCEDURE — 99214 OFFICE O/P EST MOD 30 MIN: CPT | Mod: 95,,, | Performed by: INTERNAL MEDICINE

## 2024-05-09 RX ORDER — HYDROXYCHLOROQUINE SULFATE 200 MG/1
TABLET, FILM COATED ORAL
Qty: 180 TABLET | Refills: 4 | Status: SHIPPED | OUTPATIENT
Start: 2024-05-09

## 2024-05-09 RX ORDER — BUTALB/ACETAMINOPHEN/CAFFEINE 50-325-40
1 TABLET ORAL 2 TIMES DAILY
COMMUNITY

## 2024-05-09 ASSESSMENT — SYSTEMIC LUPUS ERYTHEMATOSUS DISEASE ACTIVITY INDEX (SLEDAI): TOTAL_SCORE: 0

## 2024-05-09 NOTE — PROGRESS NOTES
Chief Complaint   Patient presents with    Disease Management       Patient with systemic lupus for a follow up    The patient location is:home  The chief complaint leading to consultation is: systemic lupus    Visit type:audiovisual    Face to Face time with patient: 20   minutes of total time spent on the encounter, which includes face to face time and non-face to face time preparing to see the patient (eg, review of tests), Obtaining and/or reviewing separately obtained history, Documenting clinical information in the electronic or other health record, Independently interpreting results (not separately reported) and communicating results to the patient/family/caregiver, or Care coordination (not separately reported).         Each patient to whom he or she provides medical services by telemedicine is:  (1) informed of the relationship between the physician and patient and the respective role of any other health care provider with respect to management of the patient; and (2) notified that he or she may decline to receive medical services by telemedicine and may withdraw from such care at any time.    Notes:         History of presenting illness    48 year old female     She has had systemic lupus for 4 years  Joints + photosensitive skin rash + pleuropericarditis    She has been on plaquenil 200 mg bid    She has been symptom free for some time now    Eye exam feb 2019 last one    2 episodes of joint pain in the past year  Needed only 2.5 mg prednisone each time  But otherwise she has done well    5/2021    nml CBC  nml CMP  ESR 4  CRP nml  Dsdna positive,1:80  nml complements    Really has no symptoms today    One episode of chest pain but that lasted briefly and attributed to stress of being in school  No sob usually    11/2021    No complaints today  No joint pains  No chest pains    Labs  nml CBC,CMP,ESR,CRP  nml complements    UA 1+ occult blood  She will talk to gyn  Protein-urine-absent    Dsdna 1:  20      6/2022    No complaints today  No joint pains  No chest pains    UA,UPCR nml  nml complements  ESR,CRP nml  Dsdna 1: 40  Vit d nml  CMP nml  CBC nml      3/2023    No complaints today  Chest pains never came back    Labs   CBC,CMP,ESR,CRP,complements  Dsdna 1: 20  UA,UPCR nml    Plaquenil 200 mg bid    5/2024    No complaints today    Nml CBC  Nml CMP  Nml ESR,CRP  Nml complements  Dsdna 1: 20  UA white cells   No blood,no protein    On plaquenil 200 mg bid      Past history : none    Family history : maternal cousin has lupus    Social history : current smoker      Review of Systems   Constitutional:  Negative for activity change, appetite change, chills, diaphoresis, fatigue, fever and unexpected weight change.   HENT:  Negative for congestion, dental problem, drooling, ear discharge, ear pain, facial swelling, hearing loss, mouth sores, nosebleeds, postnasal drip, rhinorrhea, sinus pressure, sinus pain, sneezing, sore throat, tinnitus, trouble swallowing and voice change.    Eyes:  Negative for photophobia, pain, discharge, redness, itching and visual disturbance.   Respiratory:  Negative for apnea, cough, choking, chest tightness, shortness of breath, wheezing and stridor.    Cardiovascular:  Negative for chest pain, palpitations and leg swelling.   Gastrointestinal:  Negative for abdominal distention, abdominal pain, anal bleeding, blood in stool, constipation, diarrhea, nausea, rectal pain and vomiting.   Endocrine: Negative for cold intolerance, heat intolerance, polydipsia, polyphagia and polyuria.   Genitourinary:  Negative for decreased urine volume, difficulty urinating, dysuria, enuresis, flank pain, frequency, genital sores, hematuria and urgency.   Musculoskeletal:  Negative for arthralgias, back pain, gait problem, joint swelling, myalgias, neck pain and neck stiffness.   Skin:  Negative for color change, pallor, rash and wound.   Allergic/Immunologic: Negative for environmental allergies,  food allergies and immunocompromised state.   Neurological:  Negative for dizziness, tremors, seizures, syncope, facial asymmetry, speech difficulty, weakness, light-headedness, numbness and headaches.   Hematological:  Negative for adenopathy. Does not bruise/bleed easily.   Psychiatric/Behavioral:  Negative for agitation, behavioral problems, confusion, decreased concentration, dysphoric mood, hallucinations, self-injury, sleep disturbance and suicidal ideas. The patient is not nervous/anxious and is not hyperactive.      Physical Exam  Physical Exam   Constitutional: She is oriented to person, place, and time. No distress.   HENT:   Head: Normocephalic.   Mouth/Throat: Oropharynx is clear and moist.   Eyes: Pupils are equal, round, and reactive to light. Conjunctivae are normal. Right eye exhibits no discharge. Left eye exhibits no discharge. No scleral icterus.   Neck: No thyromegaly present.   Cardiovascular: Normal rate, regular rhythm and normal heart sounds.   Pulmonary/Chest: Effort normal and breath sounds normal. No stridor.   Abdominal: Soft. Bowel sounds are normal.   Musculoskeletal:         General: Normal range of motion.      Cervical back: Normal range of motion.   Lymphadenopathy:     She has no cervical adenopathy.   Neurological: She is alert and oriented to person, place, and time.   Skin: Skin is warm. No rash noted. She is not diaphoretic.   Psychiatric: Affect and judgment normal.         Assessment     1. Osteopenia of multiple sites    2. Other systemic lupus erythematosus with other organ involvement    3. Nicotine dependence, uncomplicated, unspecified nicotine product type            47 year old female   She has had systemic lupus for 3 years  Joints + photosensitive skin rash + pleuropericarditis    She has been on plaquenil 200 mg bid    2 episodes of joint flares  One with objective evidence : high ESR,dsdna titre  Symptoms resolved with one tab on 2.5 mg prednisone only     dexa  osteopenia in the left femoral neck  Low FRAX- 2021 5/2024    No complaints today    Nml CBC  Nml CMP  Nml ESR,CRP  Nml complements  Dsdna 1: 20  UA white cells   No blood,no protein    On plaquenil 200 mg bid    No joint flares  Sun protection really helps     F/u problem    Plan    Continue current meds    Quit using any form of nicotine    Plaquenil eye exam march 2023  Suggested to do one for this year    VACCINES -   Flu,pneumonia,covidx 3    Kehinde 1200 mg and vit d 2000 IU  VIT D NML  DEXA rpt    Refills  Plaquenil     Labs in 12 months    Rico was seen today for disease management.    Diagnoses and all orders for this visit:    Osteopenia of multiple sites  -     DXA Bone Density Axial Skeleton 1 or more sites; Future  -     CBC Auto Differential; Future  -     Comprehensive Metabolic Panel; Future  -     Sedimentation rate; Future  -     C-Reactive Protein; Future  -     C3 Complement; Future  -     C4 Complement; Future  -     Anti-DNA Ab, Double-Stranded; Future  -     Urinalysis; Future  -     Protein/Creatinine Ratio, Urine; Future    Other systemic lupus erythematosus with other organ involvement  -     CBC Auto Differential; Future  -     Comprehensive Metabolic Panel; Future  -     Sedimentation rate; Future  -     C-Reactive Protein; Future  -     C3 Complement; Future  -     C4 Complement; Future  -     Anti-DNA Ab, Double-Stranded; Future  -     Urinalysis; Future  -     Protein/Creatinine Ratio, Urine; Future  -     hydroxychloroquine (PLAQUENIL) 200 mg tablet; Take 2 tablets (400 mg total) by mouth once daily.    Nicotine dependence, uncomplicated, unspecified nicotine product type  -     CBC Auto Differential; Future  -     Comprehensive Metabolic Panel; Future  -     Sedimentation rate; Future  -     C-Reactive Protein; Future  -     C3 Complement; Future  -     C4 Complement; Future  -     Anti-DNA Ab, Double-Stranded; Future  -     Urinalysis; Future  -     Protein/Creatinine Ratio, Urine;  Future          Labs and rtc in 12 months

## 2024-05-14 ENCOUNTER — HOSPITAL ENCOUNTER (OUTPATIENT)
Dept: RADIOLOGY | Facility: HOSPITAL | Age: 49
Discharge: HOME | End: 2024-05-14
Attending: INTERNAL MEDICINE
Payer: COMMERCIAL

## 2024-05-14 DIAGNOSIS — M85.89 OSTEOPENIA OF MULTIPLE SITES: ICD-10-CM

## 2024-05-14 PROCEDURE — 77080 DXA BONE DENSITY AXIAL: CPT | Mod: TC

## 2024-05-14 PROCEDURE — 77080 DXA BONE DENSITY AXIAL: CPT | Mod: 26,,, | Performed by: RADIOLOGY

## 2025-05-09 ENCOUNTER — TELEPHONE (OUTPATIENT)
Dept: RHEUMATOLOGY | Facility: CLINIC | Age: 50
End: 2025-05-09
Payer: COMMERCIAL

## 2025-05-09 DIAGNOSIS — M32.19 OTHER SYSTEMIC LUPUS ERYTHEMATOSUS WITH OTHER ORGAN INVOLVEMENT: Primary | ICD-10-CM

## 2025-07-24 ENCOUNTER — OFFICE VISIT (OUTPATIENT)
Dept: RHEUMATOLOGY | Facility: CLINIC | Age: 50
End: 2025-07-24
Payer: COMMERCIAL

## 2025-07-24 VITALS
BODY MASS INDEX: 27.51 KG/M2 | DIASTOLIC BLOOD PRESSURE: 91 MMHG | WEIGHT: 170.44 LBS | SYSTOLIC BLOOD PRESSURE: 139 MMHG | HEART RATE: 67 BPM

## 2025-07-24 DIAGNOSIS — M32.19 OTHER SYSTEMIC LUPUS ERYTHEMATOSUS WITH OTHER ORGAN INVOLVEMENT: ICD-10-CM

## 2025-07-24 PROCEDURE — 99999 PR PBB SHADOW E&M-EST. PATIENT-LVL III: CPT | Mod: PBBFAC,,,

## 2025-07-24 RX ORDER — IBUPROFEN 800 MG/1
800 TABLET, FILM COATED ORAL DAILY PRN
Qty: 30 TABLET | Refills: 1 | Status: SHIPPED | OUTPATIENT
Start: 2025-07-24

## 2025-07-24 RX ORDER — HYDROXYCHLOROQUINE SULFATE 200 MG/1
400 TABLET, FILM COATED ORAL DAILY
Qty: 180 TABLET | Refills: 0 | Status: SHIPPED | OUTPATIENT
Start: 2025-07-24

## 2025-07-24 NOTE — PROGRESS NOTES
Subjective:      Patient ID: Rico Copeland is a 49 y.o. female.    Chief Complaint: Follow up on SLE    HPI  Rico Copeland is a 50yo female with SLE, diagnosed in Dec 2016, who presents for follow up.   Previously followed by Dr. Melendez and Dr. Nagy.    +KRUNAL 1:320  +dsDNA  Cardiolipin IgM 21  +DRVVT  Severely elevated inflammation markers    Family h/o maternal cousin with SLE, another maternal cousin being tested for autoimmune dx now    Initially presented with:  Pleuropericarditis  Photosensitive rash  Nasal ulcers  Inflammatory arthritis    Currently:  Plaquenil 400mg (Eye exam May 2025 WNL)    Pt notes an episode of muscle tension/stiffness of neck around July 4. Her sister had ibuprofen 800mg, symptoms resolved after one dose.    Otherwise no recent SLE flares. She denies AM joint pain, stiffness, swelling.  She is aware of her triggers.    DXA 2024 with normal bone density    Review of Systems   Constitutional:  Negative for fever and unexpected weight change.   HENT:  Negative for mouth sores and trouble swallowing.    Eyes:  Negative for redness.   Respiratory:  Negative for cough and shortness of breath.    Cardiovascular:  Negative for chest pain.   Gastrointestinal:  Negative for constipation and diarrhea.   Genitourinary:  Negative for dysuria and genital sores.   Skin:  Negative for rash.   Neurological:  Positive for headaches.   Hematological:  Does not bruise/bleed easily.        Objective:   BP (!) 139/91   Pulse 67   Wt 77.3 kg (170 lb 6.7 oz)   BMI 27.51 kg/m²   Physical Exam   Constitutional: She is oriented to person, place, and time. normal appearance.   Cardiovascular: Normal rate, regular rhythm, normal heart sounds and normal pulses.   Pulmonary/Chest: Effort normal and breath sounds normal.   Musculoskeletal:         General: Normal range of motion.      Cervical back: Normal range of motion and neck supple.   Neurological: She is alert and oriented to person,  place, and time.   Skin: Skin is warm and dry.   Psychiatric: Her behavior is normal. Mood normal.        Assessment:     1. Other systemic lupus erythematosus with other organ involvement          Plan:     Problem List Items Addressed This Visit       Systemic lupus erythematosus    Relevant Medications    ibuprofen (ADVIL,MOTRIN) 800 MG tablet    hydroxychloroquine (PLAQUENIL) 200 mg tablet   Recent SLE labs remain stable.   She notes no recent SLE flares, and denies AM joint pain, stiffness, or swelling.  Continue Plaquenil 400mg daily, with yearly Plaquenil eye exam. Recent eye exam May 2025 was stable.  Start ibuprofen 800mg daily PRN for flare.    SLE labs in 6 months  RTO 1yr, or sooner if symptoms arise.